# Patient Record
Sex: MALE | Race: OTHER | HISPANIC OR LATINO
[De-identification: names, ages, dates, MRNs, and addresses within clinical notes are randomized per-mention and may not be internally consistent; named-entity substitution may affect disease eponyms.]

---

## 2018-06-14 PROBLEM — Z00.00 ENCOUNTER FOR PREVENTIVE HEALTH EXAMINATION: Status: ACTIVE | Noted: 2018-06-14

## 2018-06-28 ENCOUNTER — APPOINTMENT (OUTPATIENT)
Dept: OPHTHALMOLOGY | Facility: CLINIC | Age: 83
End: 2018-06-28
Payer: MEDICARE

## 2018-06-28 DIAGNOSIS — H25.13 AGE-RELATED NUCLEAR CATARACT, BILATERAL: ICD-10-CM

## 2018-06-28 DIAGNOSIS — Z87.891 PERSONAL HISTORY OF NICOTINE DEPENDENCE: ICD-10-CM

## 2018-06-28 DIAGNOSIS — R73.03 PREDIABETES.: ICD-10-CM

## 2018-06-28 PROCEDURE — 92133 CPTRZD OPH DX IMG PST SGM ON: CPT

## 2018-06-28 PROCEDURE — 92285 EXTERNAL OCULAR PHOTOGRAPHY: CPT

## 2018-06-28 PROCEDURE — 99204 OFFICE O/P NEW MOD 45 MIN: CPT

## 2018-07-02 ENCOUNTER — RX RENEWAL (OUTPATIENT)
Age: 83
End: 2018-07-02

## 2018-07-02 RX ORDER — PREDNISOLONE ACETATE 10 MG/ML
1 SUSPENSION/ DROPS OPHTHALMIC
Qty: 10 | Refills: 3 | Status: DISCONTINUED | COMMUNITY
Start: 2018-07-02 | End: 2018-07-02

## 2018-07-15 ENCOUNTER — TRANSCRIPTION ENCOUNTER (OUTPATIENT)
Age: 83
End: 2018-07-15

## 2018-07-16 ENCOUNTER — OUTPATIENT (OUTPATIENT)
Dept: OUTPATIENT SERVICES | Facility: HOSPITAL | Age: 83
LOS: 1 days | End: 2018-07-16
Payer: MEDICARE

## 2018-07-16 ENCOUNTER — APPOINTMENT (OUTPATIENT)
Dept: OPHTHALMOLOGY | Facility: HOSPITAL | Age: 83
End: 2018-07-16
Payer: MEDICARE

## 2018-07-16 ENCOUNTER — RESULT REVIEW (OUTPATIENT)
Age: 83
End: 2018-07-16

## 2018-07-16 VITALS
TEMPERATURE: 99 F | WEIGHT: 262.35 LBS | HEIGHT: 64.5 IN | OXYGEN SATURATION: 97 % | DIASTOLIC BLOOD PRESSURE: 76 MMHG | HEART RATE: 76 BPM | SYSTOLIC BLOOD PRESSURE: 148 MMHG | RESPIRATION RATE: 13 BRPM

## 2018-07-16 VITALS
SYSTOLIC BLOOD PRESSURE: 151 MMHG | OXYGEN SATURATION: 97 % | DIASTOLIC BLOOD PRESSURE: 62 MMHG | RESPIRATION RATE: 20 BRPM | HEART RATE: 75 BPM

## 2018-07-16 DIAGNOSIS — Z98.890 OTHER SPECIFIED POSTPROCEDURAL STATES: Chronic | ICD-10-CM

## 2018-07-16 DIAGNOSIS — C44.122 SQUAMOUS CELL CARCINOMA OF SKIN OF RIGHT EYELID, INCLUDING CANTHUS: ICD-10-CM

## 2018-07-16 PROCEDURE — C1889: CPT

## 2018-07-16 PROCEDURE — 65426 REMOVAL OF EYE LESION: CPT | Mod: RT

## 2018-07-16 PROCEDURE — V2790: CPT

## 2018-07-16 PROCEDURE — 65779 COVER EYE W/MEMBRANE SUTURE: CPT | Mod: RT

## 2018-07-16 PROCEDURE — 68115 EXC LES CONJUNCTIVA >1 CM: CPT | Mod: RT

## 2018-07-16 PROCEDURE — 68110 EXC LES CONJUNCTIVA <1 CM: CPT | Mod: RT

## 2018-07-16 PROCEDURE — 65450 TREATMENT OF CORNEAL LESION: CPT | Mod: RT

## 2018-07-17 ENCOUNTER — APPOINTMENT (OUTPATIENT)
Dept: OPHTHALMOLOGY | Facility: CLINIC | Age: 83
End: 2018-07-17
Payer: MEDICARE

## 2018-07-17 PROCEDURE — 99024 POSTOP FOLLOW-UP VISIT: CPT

## 2018-07-17 RX ORDER — NEOMYCIN SULFATE, POLYMYXIN B SULFATE AND DEXAMETHASONE 3.5; 10000; 1 MG/ML; [USP'U]/ML; MG/ML
3.5-10000-0.1 SUSPENSION OPHTHALMIC 4 TIMES DAILY
Qty: 1 | Refills: 5 | Status: ACTIVE | COMMUNITY
Start: 2018-07-17 | End: 1900-01-01

## 2018-07-26 ENCOUNTER — APPOINTMENT (OUTPATIENT)
Dept: OPHTHALMOLOGY | Facility: CLINIC | Age: 83
End: 2018-07-26
Payer: MEDICARE

## 2018-07-26 DIAGNOSIS — H40.003 PREGLAUCOMA, UNSPECIFIED, BILATERAL: ICD-10-CM

## 2018-07-26 PROBLEM — I42.9 CARDIOMYOPATHY, UNSPECIFIED: Chronic | Status: ACTIVE | Noted: 2018-07-16

## 2018-07-26 PROBLEM — I10 ESSENTIAL (PRIMARY) HYPERTENSION: Chronic | Status: ACTIVE | Noted: 2018-07-16

## 2018-07-26 PROCEDURE — 99024 POSTOP FOLLOW-UP VISIT: CPT

## 2018-07-26 RX ORDER — DORZOLAMIDE HYDROCHLORIDE 20 MG/ML
2 SOLUTION OPHTHALMIC 3 TIMES DAILY
Qty: 1 | Refills: 5 | Status: ACTIVE | COMMUNITY
Start: 2018-07-26 | End: 1900-01-01

## 2018-07-26 RX ORDER — PREDNISOLONE ACETATE 10 MG/ML
1 SUSPENSION/ DROPS OPHTHALMIC DAILY
Qty: 1 | Refills: 5 | Status: ACTIVE | COMMUNITY
Start: 2018-07-26 | End: 1900-01-01

## 2018-07-26 RX ORDER — OFLOXACIN 3 MG/ML
0.3 SOLUTION/ DROPS OPHTHALMIC 4 TIMES DAILY
Qty: 1 | Refills: 3 | Status: ACTIVE | COMMUNITY
Start: 2018-07-02 | End: 1900-01-01

## 2018-08-17 ENCOUNTER — APPOINTMENT (OUTPATIENT)
Dept: OPHTHALMOLOGY | Facility: CLINIC | Age: 83
End: 2018-08-17
Payer: MEDICARE

## 2018-08-17 DIAGNOSIS — C69.10: ICD-10-CM

## 2018-08-17 PROCEDURE — 99024 POSTOP FOLLOW-UP VISIT: CPT

## 2018-09-16 ENCOUNTER — TRANSCRIPTION ENCOUNTER (OUTPATIENT)
Age: 83
End: 2018-09-16

## 2019-12-19 ENCOUNTER — INPATIENT (INPATIENT)
Facility: HOSPITAL | Age: 84
LOS: 1 days | Discharge: HOME | DRG: 853 | End: 2019-12-21
Attending: FAMILY MEDICINE | Admitting: HOSPITALIST
Payer: MEDICARE

## 2019-12-19 VITALS
DIASTOLIC BLOOD PRESSURE: 51 MMHG | SYSTOLIC BLOOD PRESSURE: 97 MMHG | RESPIRATION RATE: 18 BRPM | WEIGHT: 259.04 LBS | HEART RATE: 101 BPM | HEIGHT: 65 IN | TEMPERATURE: 98 F | OXYGEN SATURATION: 93 %

## 2019-12-19 DIAGNOSIS — R07.9 CHEST PAIN, UNSPECIFIED: ICD-10-CM

## 2019-12-19 DIAGNOSIS — A41.9 SEPSIS, UNSPECIFIED ORGANISM: ICD-10-CM

## 2019-12-19 DIAGNOSIS — Z98.890 OTHER SPECIFIED POSTPROCEDURAL STATES: Chronic | ICD-10-CM

## 2019-12-19 LAB
ADD ON TEST-SPECIMEN IN LAB: SIGNIFICANT CHANGE UP
ALBUMIN SERPL ELPH-MCNC: 3.2 G/DL — LOW (ref 3.3–5)
ALP SERPL-CCNC: 55 U/L — SIGNIFICANT CHANGE UP (ref 40–120)
ALT FLD-CCNC: 16 U/L — SIGNIFICANT CHANGE UP (ref 12–78)
ANION GAP SERPL CALC-SCNC: 5 MMOL/L — SIGNIFICANT CHANGE UP (ref 5–17)
APPEARANCE UR: CLEAR — SIGNIFICANT CHANGE UP
APTT BLD: 36.8 SEC — HIGH (ref 27.5–36.3)
AST SERPL-CCNC: 16 U/L — SIGNIFICANT CHANGE UP (ref 15–37)
BASOPHILS # BLD AUTO: 0.28 K/UL — HIGH (ref 0–0.2)
BASOPHILS NFR BLD AUTO: 1 % — SIGNIFICANT CHANGE UP (ref 0–2)
BILIRUB SERPL-MCNC: 0.6 MG/DL — SIGNIFICANT CHANGE UP (ref 0.2–1.2)
BILIRUB UR-MCNC: ABNORMAL
BUN SERPL-MCNC: 26 MG/DL — HIGH (ref 7–23)
CALCIUM SERPL-MCNC: 8.3 MG/DL — LOW (ref 8.5–10.1)
CHLORIDE SERPL-SCNC: 103 MMOL/L — SIGNIFICANT CHANGE UP (ref 96–108)
CO2 SERPL-SCNC: 28 MMOL/L — SIGNIFICANT CHANGE UP (ref 22–31)
COLOR SPEC: YELLOW — SIGNIFICANT CHANGE UP
CREAT SERPL-MCNC: 1.67 MG/DL — HIGH (ref 0.5–1.3)
D DIMER BLD IA.RAPID-MCNC: 606 NG/ML DDU — HIGH
DIFF PNL FLD: ABNORMAL
EOSINOPHIL # BLD AUTO: 0 K/UL — SIGNIFICANT CHANGE UP (ref 0–0.5)
EOSINOPHIL NFR BLD AUTO: 0 % — SIGNIFICANT CHANGE UP (ref 0–6)
GLUCOSE SERPL-MCNC: 134 MG/DL — HIGH (ref 70–99)
GLUCOSE UR QL: NEGATIVE MG/DL — SIGNIFICANT CHANGE UP
HCT VFR BLD CALC: 39.4 % — SIGNIFICANT CHANGE UP (ref 39–50)
HGB BLD-MCNC: 12.7 G/DL — LOW (ref 13–17)
INR BLD: 1.21 RATIO — HIGH (ref 0.88–1.16)
KETONES UR-MCNC: ABNORMAL
LACTATE SERPL-SCNC: 2.3 MMOL/L — HIGH (ref 0.7–2)
LEUKOCYTE ESTERASE UR-ACNC: ABNORMAL
LYMPHOCYTES # BLD AUTO: 0.84 K/UL — LOW (ref 1–3.3)
LYMPHOCYTES # BLD AUTO: 3 % — LOW (ref 13–44)
MCHC RBC-ENTMCNC: 28.7 PG — SIGNIFICANT CHANGE UP (ref 27–34)
MCHC RBC-ENTMCNC: 32.2 GM/DL — SIGNIFICANT CHANGE UP (ref 32–36)
MCV RBC AUTO: 89.1 FL — SIGNIFICANT CHANGE UP (ref 80–100)
MONOCYTES # BLD AUTO: 1.12 K/UL — HIGH (ref 0–0.9)
MONOCYTES NFR BLD AUTO: 4 % — SIGNIFICANT CHANGE UP (ref 2–14)
NEUTROPHILS # BLD AUTO: 25.53 K/UL — HIGH (ref 1.8–7.4)
NEUTROPHILS NFR BLD AUTO: 84 % — HIGH (ref 43–77)
NITRITE UR-MCNC: NEGATIVE — SIGNIFICANT CHANGE UP
NRBC # BLD: SIGNIFICANT CHANGE UP /100 WBCS (ref 0–0)
PH UR: 5 — SIGNIFICANT CHANGE UP (ref 5–8)
PLATELET # BLD AUTO: 215 K/UL — SIGNIFICANT CHANGE UP (ref 150–400)
POTASSIUM SERPL-MCNC: 4 MMOL/L — SIGNIFICANT CHANGE UP (ref 3.5–5.3)
POTASSIUM SERPL-SCNC: 4 MMOL/L — SIGNIFICANT CHANGE UP (ref 3.5–5.3)
PROT SERPL-MCNC: 7.4 GM/DL — SIGNIFICANT CHANGE UP (ref 6–8.3)
PROT UR-MCNC: 30 MG/DL
PROTHROM AB SERPL-ACNC: 13.5 SEC — HIGH (ref 10–12.9)
RBC # BLD: 4.42 M/UL — SIGNIFICANT CHANGE UP (ref 4.2–5.8)
RBC # FLD: 14.3 % — SIGNIFICANT CHANGE UP (ref 10.3–14.5)
SODIUM SERPL-SCNC: 136 MMOL/L — SIGNIFICANT CHANGE UP (ref 135–145)
SP GR SPEC: 1.02 — SIGNIFICANT CHANGE UP (ref 1.01–1.02)
TROPONIN I SERPL-MCNC: 0.24 NG/ML — HIGH (ref 0.01–0.04)
UROBILINOGEN FLD QL: 1 MG/DL
WBC # BLD: 28.06 K/UL — HIGH (ref 3.8–10.5)
WBC # FLD AUTO: 28.06 K/UL — HIGH (ref 3.8–10.5)

## 2019-12-19 PROCEDURE — 86901 BLOOD TYPING SEROLOGIC RH(D): CPT

## 2019-12-19 PROCEDURE — 94640 AIRWAY INHALATION TREATMENT: CPT

## 2019-12-19 PROCEDURE — 71275 CT ANGIOGRAPHY CHEST: CPT | Mod: 26

## 2019-12-19 PROCEDURE — 93010 ELECTROCARDIOGRAM REPORT: CPT

## 2019-12-19 PROCEDURE — 87633 RESP VIRUS 12-25 TARGETS: CPT

## 2019-12-19 PROCEDURE — 71045 X-RAY EXAM CHEST 1 VIEW: CPT | Mod: 26

## 2019-12-19 PROCEDURE — C1894: CPT

## 2019-12-19 PROCEDURE — 85025 COMPLETE CBC W/AUTO DIFF WBC: CPT

## 2019-12-19 PROCEDURE — C1887: CPT

## 2019-12-19 PROCEDURE — 80053 COMPREHEN METABOLIC PANEL: CPT

## 2019-12-19 PROCEDURE — 83605 ASSAY OF LACTIC ACID: CPT

## 2019-12-19 PROCEDURE — 93971 EXTREMITY STUDY: CPT | Mod: RT

## 2019-12-19 PROCEDURE — C1874: CPT

## 2019-12-19 PROCEDURE — 86900 BLOOD TYPING SEROLOGIC ABO: CPT

## 2019-12-19 PROCEDURE — C9600: CPT | Mod: RC

## 2019-12-19 PROCEDURE — 80048 BASIC METABOLIC PNL TOTAL CA: CPT

## 2019-12-19 PROCEDURE — C1769: CPT

## 2019-12-19 PROCEDURE — 84484 ASSAY OF TROPONIN QUANT: CPT

## 2019-12-19 PROCEDURE — 97116 GAIT TRAINING THERAPY: CPT | Mod: GP

## 2019-12-19 PROCEDURE — G0269: CPT

## 2019-12-19 PROCEDURE — C1889: CPT

## 2019-12-19 PROCEDURE — 87486 CHLMYD PNEUM DNA AMP PROBE: CPT

## 2019-12-19 PROCEDURE — 93306 TTE W/DOPPLER COMPLETE: CPT

## 2019-12-19 PROCEDURE — 87581 M.PNEUMON DNA AMP PROBE: CPT

## 2019-12-19 PROCEDURE — 97163 PT EVAL HIGH COMPLEX 45 MIN: CPT | Mod: GP

## 2019-12-19 PROCEDURE — C1760: CPT

## 2019-12-19 PROCEDURE — 93005 ELECTROCARDIOGRAM TRACING: CPT

## 2019-12-19 PROCEDURE — 36415 COLL VENOUS BLD VENIPUNCTURE: CPT

## 2019-12-19 PROCEDURE — 86850 RBC ANTIBODY SCREEN: CPT

## 2019-12-19 PROCEDURE — 87798 DETECT AGENT NOS DNA AMP: CPT

## 2019-12-19 PROCEDURE — C1725: CPT

## 2019-12-19 PROCEDURE — 93460 R&L HRT ART/VENTRICLE ANGIO: CPT | Mod: 59

## 2019-12-19 RX ORDER — AZITHROMYCIN 500 MG/1
500 TABLET, FILM COATED ORAL ONCE
Refills: 0 | Status: COMPLETED | OUTPATIENT
Start: 2019-12-19 | End: 2019-12-19

## 2019-12-19 RX ORDER — SODIUM CHLORIDE 9 MG/ML
1900 INJECTION INTRAMUSCULAR; INTRAVENOUS; SUBCUTANEOUS ONCE
Refills: 0 | Status: COMPLETED | OUTPATIENT
Start: 2019-12-19 | End: 2019-12-19

## 2019-12-19 RX ORDER — CEFTRIAXONE 500 MG/1
1000 INJECTION, POWDER, FOR SOLUTION INTRAMUSCULAR; INTRAVENOUS ONCE
Refills: 0 | Status: COMPLETED | OUTPATIENT
Start: 2019-12-19 | End: 2019-12-19

## 2019-12-19 RX ADMIN — SODIUM CHLORIDE 1266.67 MILLILITER(S): 9 INJECTION INTRAMUSCULAR; INTRAVENOUS; SUBCUTANEOUS at 23:16

## 2019-12-19 RX ADMIN — AZITHROMYCIN 255 MILLIGRAM(S): 500 TABLET, FILM COATED ORAL at 23:22

## 2019-12-19 RX ADMIN — CEFTRIAXONE 1000 MILLIGRAM(S): 500 INJECTION, POWDER, FOR SOLUTION INTRAMUSCULAR; INTRAVENOUS at 23:21

## 2019-12-19 NOTE — ED PROVIDER NOTE - ENMT, MLM
Airway patent, Nasal mucosa clear. Mouth with slightly dry mucosa. Throat has no vesicles, no oropharyngeal exudates and uvula is midline. +denture

## 2019-12-19 NOTE — ED PROVIDER NOTE - CARE PLAN
Principal Discharge DX:	Chest pain  Secondary Diagnosis:	Shortness of breath  Secondary Diagnosis:	Leukocytosis, unspecified type

## 2019-12-19 NOTE — ED ADULT TRIAGE NOTE - CHIEF COMPLAINT QUOTE
pt c/o intermittent SOB and chest pain that began this AM. has since resolved. code sepsis called at 2000.

## 2019-12-19 NOTE — ED PROVIDER NOTE - CHPI ED SYMPTOMS POS
CHEST PAIN/COUGH/CHEST TIGHTNESS/SHORTNESS OF BREATH/+SOB +diffuse myalgias +lightheadedness +weakness +fatigue + headache +chills/BACK PAIN

## 2019-12-19 NOTE — ED PROVIDER NOTE - CLINICAL SUMMARY MEDICAL DECISION MAKING FREE TEXT BOX
84  male PMHx of cardiomyopathy, HTN BIBA from home regarding intermittent CP, SOB, diffuse myalgias +chills +cough since this AM. Pt does not meet sepsis criteria by ED vitals. Plan: EKG, CXR labs including pan culture, troponin, BNP, D dimer. Monitor observes reassess. Addendum: elevated white count of 28, blood cultures lactate ordered, d dimer elevated with elevated troponin, CTA chest ordered.

## 2019-12-19 NOTE — ED PROVIDER NOTE - MUSCULOSKELETAL, MLM
Spine appears normal, range of motion is not limited, no muscle or joint tenderness. [place] x4, left leg tenderness +varicosities no pitting edema

## 2019-12-19 NOTE — ED ADULT NURSE NOTE - NSIMPLEMENTINTERV_GEN_ALL_ED
Implemented All Universal Safety Interventions:  [place] to call system. Call bell, personal items and telephone within reach. Instruct patient to call for assistance. Room bathroom lighting operational. Non-slip footwear when patient is off stretcher. Physically safe environment: no spills, clutter or unnecessary equipment. Stretcher in lowest position, wheels locked, appropriate side rails in place.

## 2019-12-19 NOTE — ED PROVIDER NOTE - OBJECTIVE STATEMENT
85 y/o male with PMHx of cardiomyopathy, HTN not on meds presents to ED BIBEMS c/o intermittent chest pain and SOB that began this morning. Pt does not meet sepsis criteria. Pt is [place] speaking and daughter at bedside is translating. Daughter at bedside reports pt was feeling nml this morning when he drove her to the train station and on his way home he felt chills in his hands and lightheadedness driving home. When he got home he had chills and was shivering but symptoms resolved after his wife covered him in blankets. +fatigue +SOB + left chest tightness worsens with breathing +weakness +back pain +diffuse myalgias +HA. x3-4 weeks family members with URI. No abd pain, no n/v/d. No other complaints at this time. NKDA. PCP: Delisa

## 2019-12-19 NOTE — ED ADULT NURSE NOTE - OBJECTIVE STATEMENT
Pt BIBA with daughter co intermittent CP since this Pt BIBA with daughter co intermittent CP since this morning at 0800. Pt states he has no medical history to his knowledge. Pt endorses SOB, states his chest pain has resolved. Pt also co minor headache.    Pt AnOx3, lung sounds clear, diminished at the bases. audible wheezing heard upon expiration. Pt placed on the monitor, normal sinus rhythm/sinus tachycardia intermittently on the monitor. Pt denies abdominal pain-. Abdomen soft and nontender. Bowel sounds hypoactive. Pt able to move all of his extremities. Bilateral lower extremities swollen, +2/+3 pitting edema. Pt endorses pain upon palpation of lower legs. As per family, the pts legs are always like that

## 2019-12-20 DIAGNOSIS — I25.10 ATHEROSCLEROTIC HEART DISEASE OF NATIVE CORONARY ARTERY WITHOUT ANGINA PECTORIS: ICD-10-CM

## 2019-12-20 LAB
ALBUMIN SERPL ELPH-MCNC: 2.7 G/DL — LOW (ref 3.3–5)
ALP SERPL-CCNC: 49 U/L — SIGNIFICANT CHANGE UP (ref 40–120)
ALT FLD-CCNC: 13 U/L — SIGNIFICANT CHANGE UP (ref 12–78)
ANION GAP SERPL CALC-SCNC: 5 MMOL/L — SIGNIFICANT CHANGE UP (ref 5–17)
AST SERPL-CCNC: 11 U/L — LOW (ref 15–37)
BASOPHILS # BLD AUTO: 0.04 K/UL — SIGNIFICANT CHANGE UP (ref 0–0.2)
BASOPHILS NFR BLD AUTO: 0.2 % — SIGNIFICANT CHANGE UP (ref 0–2)
BILIRUB SERPL-MCNC: 0.4 MG/DL — SIGNIFICANT CHANGE UP (ref 0.2–1.2)
BUN SERPL-MCNC: 25 MG/DL — HIGH (ref 7–23)
CALCIUM SERPL-MCNC: 7.7 MG/DL — LOW (ref 8.5–10.1)
CHLORIDE SERPL-SCNC: 110 MMOL/L — HIGH (ref 96–108)
CO2 SERPL-SCNC: 26 MMOL/L — SIGNIFICANT CHANGE UP (ref 22–31)
CREAT SERPL-MCNC: 1.22 MG/DL — SIGNIFICANT CHANGE UP (ref 0.5–1.3)
EOSINOPHIL # BLD AUTO: 0.01 K/UL — SIGNIFICANT CHANGE UP (ref 0–0.5)
EOSINOPHIL NFR BLD AUTO: 0 % — SIGNIFICANT CHANGE UP (ref 0–6)
GLUCOSE SERPL-MCNC: 102 MG/DL — HIGH (ref 70–99)
HCT VFR BLD CALC: 36.2 % — LOW (ref 39–50)
HGB BLD-MCNC: 11.5 G/DL — LOW (ref 13–17)
IMM GRANULOCYTES NFR BLD AUTO: 0.6 % — SIGNIFICANT CHANGE UP (ref 0–1.5)
LYMPHOCYTES # BLD AUTO: 1.17 K/UL — SIGNIFICANT CHANGE UP (ref 1–3.3)
LYMPHOCYTES # BLD AUTO: 5.8 % — LOW (ref 13–44)
MCHC RBC-ENTMCNC: 28.3 PG — SIGNIFICANT CHANGE UP (ref 27–34)
MCHC RBC-ENTMCNC: 31.8 GM/DL — LOW (ref 32–36)
MCV RBC AUTO: 88.9 FL — SIGNIFICANT CHANGE UP (ref 80–100)
MONOCYTES # BLD AUTO: 0.9 K/UL — SIGNIFICANT CHANGE UP (ref 0–0.9)
MONOCYTES NFR BLD AUTO: 4.5 % — SIGNIFICANT CHANGE UP (ref 2–14)
NEUTROPHILS # BLD AUTO: 17.86 K/UL — HIGH (ref 1.8–7.4)
NEUTROPHILS NFR BLD AUTO: 88.9 % — HIGH (ref 43–77)
PLATELET # BLD AUTO: 174 K/UL — SIGNIFICANT CHANGE UP (ref 150–400)
POTASSIUM SERPL-MCNC: 3.7 MMOL/L — SIGNIFICANT CHANGE UP (ref 3.5–5.3)
POTASSIUM SERPL-SCNC: 3.7 MMOL/L — SIGNIFICANT CHANGE UP (ref 3.5–5.3)
PROT SERPL-MCNC: 6.2 GM/DL — SIGNIFICANT CHANGE UP (ref 6–8.3)
RAPID RVP RESULT: SIGNIFICANT CHANGE UP
RBC # BLD: 4.07 M/UL — LOW (ref 4.2–5.8)
RBC # FLD: 14.6 % — HIGH (ref 10.3–14.5)
SODIUM SERPL-SCNC: 141 MMOL/L — SIGNIFICANT CHANGE UP (ref 135–145)
TROPONIN I SERPL-MCNC: 0.28 NG/ML — HIGH (ref 0.01–0.04)
TROPONIN I SERPL-MCNC: 0.29 NG/ML — HIGH (ref 0.01–0.04)
WBC # BLD: 20.11 K/UL — HIGH (ref 3.8–10.5)
WBC # FLD AUTO: 20.11 K/UL — HIGH (ref 3.8–10.5)

## 2019-12-20 PROCEDURE — 93010 ELECTROCARDIOGRAM REPORT: CPT

## 2019-12-20 PROCEDURE — 93306 TTE W/DOPPLER COMPLETE: CPT | Mod: 26

## 2019-12-20 PROCEDURE — 93971 EXTREMITY STUDY: CPT | Mod: 26,RT

## 2019-12-20 RX ORDER — METOPROLOL TARTRATE 50 MG
25 TABLET ORAL
Refills: 0 | Status: DISCONTINUED | OUTPATIENT
Start: 2019-12-20 | End: 2019-12-21

## 2019-12-20 RX ORDER — ACETAMINOPHEN 500 MG
650 TABLET ORAL ONCE
Refills: 0 | Status: COMPLETED | OUTPATIENT
Start: 2019-12-20 | End: 2019-12-20

## 2019-12-20 RX ORDER — VANCOMYCIN HCL 1 G
750 VIAL (EA) INTRAVENOUS EVERY 12 HOURS
Refills: 0 | Status: DISCONTINUED | OUTPATIENT
Start: 2019-12-20 | End: 2019-12-21

## 2019-12-20 RX ORDER — CLOPIDOGREL BISULFATE 75 MG/1
75 TABLET, FILM COATED ORAL DAILY
Refills: 0 | Status: DISCONTINUED | OUTPATIENT
Start: 2019-12-21 | End: 2019-12-21

## 2019-12-20 RX ORDER — CEFTRIAXONE 500 MG/1
2000 INJECTION, POWDER, FOR SOLUTION INTRAMUSCULAR; INTRAVENOUS EVERY 24 HOURS
Refills: 0 | Status: DISCONTINUED | OUTPATIENT
Start: 2019-12-20 | End: 2019-12-21

## 2019-12-20 RX ORDER — CLOPIDOGREL BISULFATE 75 MG/1
600 TABLET, FILM COATED ORAL ONCE
Refills: 0 | Status: COMPLETED | OUTPATIENT
Start: 2019-12-20 | End: 2019-12-20

## 2019-12-20 RX ORDER — ASPIRIN/CALCIUM CARB/MAGNESIUM 324 MG
325 TABLET ORAL ONCE
Refills: 0 | Status: COMPLETED | OUTPATIENT
Start: 2019-12-20 | End: 2019-12-20

## 2019-12-20 RX ORDER — ATORVASTATIN CALCIUM 80 MG/1
20 TABLET, FILM COATED ORAL AT BEDTIME
Refills: 0 | Status: DISCONTINUED | OUTPATIENT
Start: 2019-12-20 | End: 2019-12-21

## 2019-12-20 RX ORDER — ASPIRIN/CALCIUM CARB/MAGNESIUM 324 MG
81 TABLET ORAL DAILY
Refills: 0 | Status: DISCONTINUED | OUTPATIENT
Start: 2019-12-21 | End: 2019-12-21

## 2019-12-20 RX ORDER — ACETAMINOPHEN 500 MG
650 TABLET ORAL EVERY 6 HOURS
Refills: 0 | Status: DISCONTINUED | OUTPATIENT
Start: 2019-12-20 | End: 2019-12-21

## 2019-12-20 RX ORDER — SODIUM CHLORIDE 9 MG/ML
1000 INJECTION INTRAMUSCULAR; INTRAVENOUS; SUBCUTANEOUS
Refills: 0 | Status: DISCONTINUED | OUTPATIENT
Start: 2019-12-20 | End: 2019-12-20

## 2019-12-20 RX ORDER — SODIUM CHLORIDE 9 MG/ML
1000 INJECTION INTRAMUSCULAR; INTRAVENOUS; SUBCUTANEOUS
Refills: 0 | Status: DISCONTINUED | OUTPATIENT
Start: 2019-12-20 | End: 2019-12-21

## 2019-12-20 RX ADMIN — Medication 325 MILLIGRAM(S): at 10:19

## 2019-12-20 RX ADMIN — Medication 650 MILLIGRAM(S): at 01:28

## 2019-12-20 RX ADMIN — ATORVASTATIN CALCIUM 20 MILLIGRAM(S): 80 TABLET, FILM COATED ORAL at 21:32

## 2019-12-20 RX ADMIN — SODIUM CHLORIDE 75 MILLILITER(S): 9 INJECTION INTRAMUSCULAR; INTRAVENOUS; SUBCUTANEOUS at 17:20

## 2019-12-20 RX ADMIN — CLOPIDOGREL BISULFATE 600 MILLIGRAM(S): 75 TABLET, FILM COATED ORAL at 10:19

## 2019-12-20 RX ADMIN — Medication 25 MILLIGRAM(S): at 17:20

## 2019-12-20 RX ADMIN — SODIUM CHLORIDE 1900 MILLILITER(S): 9 INJECTION INTRAMUSCULAR; INTRAVENOUS; SUBCUTANEOUS at 03:36

## 2019-12-20 RX ADMIN — Medication 650 MILLIGRAM(S): at 02:00

## 2019-12-20 RX ADMIN — SODIUM CHLORIDE 50 MILLILITER(S): 9 INJECTION INTRAMUSCULAR; INTRAVENOUS; SUBCUTANEOUS at 03:36

## 2019-12-20 RX ADMIN — Medication 250 MILLIGRAM(S): at 17:14

## 2019-12-20 RX ADMIN — AZITHROMYCIN 500 MILLIGRAM(S): 500 TABLET, FILM COATED ORAL at 00:07

## 2019-12-20 RX ADMIN — CEFTRIAXONE 2000 MILLIGRAM(S): 500 INJECTION, POWDER, FOR SOLUTION INTRAMUSCULAR; INTRAVENOUS at 17:15

## 2019-12-20 NOTE — H&P ADULT - HISTORY OF PRESENT ILLNESS
84 year old male patient with pertinent hx of HTN and Cardiomyopathy(not on home medications) presented to the ED complaining of sudden onset fever, chills, and body aches associated with dyspnea on exertion and chest tightness. patient was in his usual state of health less than 12 hours prior to arrival in the ED.  Patient endorsed feeling as though he had a fever but denies and coughing, runny nose, congestion, diarrhea, abdominal pain, skin rashes, headache or dizziness. Patient seen and evaluated at the office of his PCP prior to ED arrival and was referred to the ED for hypotension and tachycardia.      In the ED patient was found to have a WBC of 28, Lactate of 2.3, troponin of 0.281 and a BUN/CR of 26/1.67

## 2019-12-20 NOTE — H&P ADULT - NSHPPHYSICALEXAM_GEN_ALL_CORE
Vital Signs Last 24 Hrs  T(C): 37.8 (19 Dec 2019 23:30), Max: 37.8 (19 Dec 2019 23:30)  T(F): 100.1 (19 Dec 2019 23:30), Max: 100.1 (19 Dec 2019 23:30)  HR: 92 (19 Dec 2019 23:30) (92 - 101)  BP: 123/54 (19 Dec 2019 23:30) (97/51 - 146/59)  BP(mean): 72 (19 Dec 2019 23:30) (72 - 84)  RR: 22 (19 Dec 2019 23:30) (18 - 26)  SpO2: 95% (19 Dec 2019 23:30) (91% - 98%)

## 2019-12-20 NOTE — CHART NOTE - NSCHARTNOTEFT_GEN_A_CORE
-Consent obtained for cardiac catheterization w/ coronary angiogram and possible stent placement. Pt is competent, has capacity, and understands risks and benefits of procedure. Patient deferred signing to his daughter, Umair Hernandez who is present at bedside. Risks and benefits discussed. Risk discussed included, but not limited to MI, stroke, mortality, major bleeding, arrythmia, or infection. All questions answered     ASA class: III  Creatinine: 1.22  GFR: 54  Bleeding  Risk score: 1.6%

## 2019-12-20 NOTE — PROGRESS NOTE ADULT - SUBJECTIVE AND OBJECTIVE BOX
Cath Lab Nurse Practitioner Note  HPI:  84 year old male patient with pertinent hx of HTN and Cardiomyopathy(not on home medications) presented to the ED complaining of sudden onset fever, chills, and body aches associated with dyspnea on exertion and chest tightness. patient was in his usual state of health less than 12 hours prior to arrival in the ED.  In the ED patient was found to have a WBC of 28, Lactate of 2.3, troponin of 0.281 and a BUN/CR of 26/1.67. Admitted for CP and leukocytosis.     Vital Signs Last 24 Hrs  T(F): 97.2   HR: 85   BP: 131/57   RR: 17  SpO2: 98%     Labs:                        11.5   20.11 )-----------( 174      ( 20 Dec 2019 07:20 )             36.2   12-20    141  |  110<H>  |  25<H>  ----------------------------<  102<H>  3.7   |  26  |  1.22    Ca    7.7<L>      20 Dec 2019 07:20    TPro  6.2  /  Alb  2.7<L>  /  TBili  0.4  /  DBili  x   /  AST  11<L>  /  ALT  13  /  AlkPhos  49  12-20    PT/INR - ( 19 Dec 2019 20:22 )   PT: 13.5 sec;   INR: 1.21 ratio         PTT - ( 19 Dec 2019 20:22 )  PTT:36.8 sec  MEDICATIONS  (STANDING):  aspirin 325 milliGRAM(s) Oral once  atorvastatin 20 milliGRAM(s) Oral at bedtime  clopidogrel Tablet 600 milliGRAM(s) Oral once  metoprolol tartrate 25 milliGRAM(s) Oral two times a day  sodium chloride 0.9%. 1000 milliLiter(s) (50 mL/Hr) IV Continuous <Continuous>    EKG: SR rate 88bpm no ST acute changes  post PCI     PROCEDURE RESULTS- full report to follow

## 2019-12-20 NOTE — PROGRESS NOTE ADULT - ASSESSMENT
84 year old male patient with pertinent hx of HTN and Cardiomyopathy(not on home medications) presented to the ED complaining of sudden onset fever, chills, and body aches associated with dyspnea on exertion and chest tightness. patient was in his usual state of health less than 12 hours prior to arrival in the ED.  In the ED patient was found to have a WBC of 28, Lactate of 2.3, troponin of 0.281 and a BUN/CR of 26/1.67. Admitted for CP and leukocytosis. Referred for cardiac catheterization for evaluation   S/P C with ROCAEL to RPDA x 1

## 2019-12-20 NOTE — PROGRESS NOTE ADULT - PROBLEM SELECTOR PLAN 1
Pt last seen:05/09/19  Upcoming appt: none  Last refill sent:03/06/19 -CAD, s/p ROCAEL to RPDA  pt to return to 3N  -IV hydration NS @ 50mL/hr x 10 hours  VS, lab, diet and activity per PCI post orders  -ASA 325mg PO x 1 now  - Plavix 600mg PO x 1 now  -will start ASA 81mg daily 12/21 plavix 75mg daily 12/21, lipitor 20mg at bedtime starting tonight, lopressor 25mg BID starting today  -f/u EKG in AM, AM Labs  -plan discussed with patient and Dr Grover and Dr. Garza   -Discussed therapeutic lifestyle changes to reduce risk factors such as following a cardiac diet, weight loss, maintaining a healthy weight, exercise, medication compliance, and regular follow-up  with PCP/Cardioloigst

## 2019-12-20 NOTE — PHYSICAL THERAPY INITIAL EVALUATION ADULT - MODALITIES TREATMENT COMMENTS
pt left in bed supine post Eval; bed alarm on; HM in place; daughter present; callbell in reach; parisa well; denied pain

## 2019-12-20 NOTE — CONSULT NOTE ADULT - ASSESSMENT
85 y/o male with h/o HTN and Cardiomyopathy (not on home medications) was admitted on 12/19 sudden onset fever, chills, and body aches associated with dyspnea on exertion and chest tightness. The patient was in his usual state of health less than 12 hours prior to arrival in the ED. Patient endorsed feeling feverish. Patient seen and evaluated at the office of his PCP prior to ED arrival and was referred to the ED for hypotension and tachycardia. In the ED patient was found to have a WBC of 28. He received ceftriaxone and azithromycin.    1. Low grade fever.   -leukocytosis 85 y/o male with h/o HTN and Cardiomyopathy (not on home medications) was admitted on 12/19 sudden onset fever, chills, and body aches associated with dyspnea on exertion and chest tightness. The patient was in his usual state of health less than 12 hours prior to arrival in the ED. Patient endorsed feeling feverish. Patient seen and evaluated at the office of his PCP prior to ED arrival and was referred to the ED for hypotension and tachycardia. In the ED patient was found to have a WBC of 28. He received ceftriaxone and azithromycin.    1. Low grade fever. Probable sepsis. RLE cellulitis. Chest pain syndrome. CAD s/p cardiac stent.  -leukocytosis  -obtain BX 2  -start vancomycin 1 gm IV q12h and ceftriaxone 2 gm IV qd  -reason for abx use and side effects reviewed with patient; monitor BMP and vancomycin trough levels   -elevate legs  -old chart reviewed to assess prior cultures  -monitor temps  -f/u CBC  -supportive care  2. Other issues:   -care per medicine

## 2019-12-20 NOTE — PROGRESS NOTE ADULT - SUBJECTIVE AND OBJECTIVE BOX
patient seen and examined in recovery room after cath.   patient was admitted by my colleague earlier today.  says dyspnea is much better after stent placement.  d/w cardiac NP.    has RLE pain, erythema and edema. will check venous doppler to r/o DVT. defer antibx to ID.    will follow. patient seen and examined in recovery room after cath.   patient was admitted by my colleague earlier today.  says dyspnea is much better after stent placement.  s/p PCI to R posterior descending artery. on aspirin/plavix/BB/statin.  d/w cardiac NP.  cardiology and ID consults appreciated.  has RLE pain, erythema and edema. will check venous doppler to r/o DVT. on vanco and ceftriaxone per ID.

## 2019-12-20 NOTE — CHART NOTE - NSCHARTNOTEFT_GEN_A_CORE
7F venous cath removed from right groin  by writer. 10mins of direct pressure applied. Hemostasis achieved. Site is without hematoma or bleeding, surrounding area soft.  Sensation and ROM intact. Distal pulses palpable, 2+ capillary refill  < 2secs. Patient denies pain, numbness, tingling, CP or SOB . Clean dry dressing applied. Patient tolerated without any complications. Perclose site soft without hematoma or bleeding, surrounding area soft.

## 2019-12-20 NOTE — PHYSICAL THERAPY INITIAL EVALUATION ADULT - CRITERIA FOR SKILLED THERAPEUTIC INTERVENTIONS
will attempt completion of Eval @ later date; pt currently on bedrest s/p cardiac Cath; further course of PT intervention pending

## 2019-12-20 NOTE — PROGRESS NOTE ADULT - ADDITIONAL PE
PROCEDURE SITE: Right femoral accessed, closed by perclose device. Site is without hematoma or bleeding. Sensation and MARGARITO intact. Distal pulses palpable 2+, capillary refill < 2 seconds. Patient denies pain, numbness, tingling, CP or SOB. Clean dry dressing applied.   Right venous sheath in placed can be removed 2 hours post procedure

## 2019-12-20 NOTE — CONSULT NOTE ADULT - SUBJECTIVE AND OBJECTIVE BOX
Chief complaint of Chest pain  SIRS (20 Dec 2019 02:06)      HPI:  84 year old male patient with pertinent hx of HTN and Cardiomyopathy(not on home medications) presented to the ED complaining of sudden onset fever, chills, and body aches associated with dyspnea on exertion and chest tightness. patient was in his usual state of health less than 12 hours prior to arrival in the ED.  Patient endorsed feeling as though he had a fever but denies and coughing, runny nose, congestion, diarrhea, abdominal pain, skin rashes, headache or dizziness. Patient seen and evaluated at the office of his PCP prior to ED arrival and was referred to the ED for hypotension and tachycardia.      In the ED patient was found to have a WBC of 28, Lactate of 2.3, troponin of 0.281 and a BUN/CR of 26/1.67 (20 Dec 2019 02:06)      PAST MEDICAL & SURGICAL HISTORY:  HTN (hypertension)  Cardiomyopathy  History of colonoscopy      PREVIOUS CARDIAC WORKUP:      Echo:  Stress Test:  Cardiac Cath:    ALLERGIES:    No Known Allergies       MEDICATIONS  (STANDING):  sodium chloride 0.9%. 1000 milliLiter(s) (50 mL/Hr) IV Continuous <Continuous>    MEDICATIONS  (PRN):  acetaminophen   Tablet .. 650 milliGRAM(s) Oral every 6 hours PRN Temp greater or equal to 38C (100.4F), Mild Pain (1 - 3)      FAMILY HISTORY:        SOCIAL HISTORY:  .scl     ROS:     .ros    Vital Signs Last 24 Hrs  T(C): 36.7 (20 Dec 2019 06:10), Max: 37.8 (19 Dec 2019 23:30)  T(F): 98 (20 Dec 2019 06:10), Max: 100.1 (19 Dec 2019 23:30)  HR: 89 (20 Dec 2019 06:10) (89 - 101)  BP: 122/65 (20 Dec 2019 06:10) (97/51 - 146/59)  BP(mean): 79 (20 Dec 2019 06:10) (72 - 84)  RR: 24 (20 Dec 2019 06:10) (18 - 26)  SpO2: 99% (20 Dec 2019 06:10) (91% - 99%)    I&O's Summary      PHYSICAL EXAM:    .phy      TELEMETRY:    ECG:    LABS:                          12.7   28.06 )-----------( 215      ( 19 Dec 2019 20:22 )             39.4     12-19    136  |  103  |  26<H>  ----------------------------<  134<H>  4.0   |  28  |  1.67<H>    Ca    8.3<L>      19 Dec 2019 20:22    TPro  7.4  /  Alb  3.2<L>  /  TBili  0.6  /  DBili  x   /  AST  16  /  ALT  16  /  AlkPhos  55   @ 03:08  Trop-I  0.290     @ 00:38  Trop-I  0.281     @ 20:22  Trop-I  0.244    Pro BNP  1831  @ 20:22  D Dimer  606  @ 20:22    PT/INR - ( 19 Dec 2019 20:22 )   PT: 13.5 sec;   INR: 1.21 ratio    PTT - ( 19 Dec 2019 20:22 )  PTT:36.8 sec    Urinalysis Basic - ( 19 Dec 2019 23:08 )    Color: Yellow / Appearance: Clear / S.020 / pH: x  Gluc: x / Ketone: Trace  / Bili: Moderate / Urobili: 1 mg/dL   Blood: x / Protein: 30 mg/dL / Nitrite: Negative   Leuk Esterase: Trace / RBC: 25-50 /HPF / WBC 3-5   Sq Epi: x / Non Sq Epi: Occasional / Bacteria: Few    RADIOLOGY & ADDITIONAL STUDIES:    CT Angio Chest PE Protocol w/ IV Cont (19 @ 22:58) >  IMPRESSION:  No pulmonary embolism. Chief complaint of Chest pain    HPI:  85 yo male with c/o sudden onset chills, low grade fever, body aches. Felt weak. BP noted to be low. He then c/o chest tightness and dyspnea on exertion. Chest pain / tightness continued. Noted to have hypotension and tachycardia while in the hospital. Per daughter who is also acting as a  he has been having progressive dyspnea and chest tightness on exertion. Noted to have abnormal CE.      PAST MEDICAL & SURGICAL HISTORY:  HTN (hypertension)  Hypertrophic Cardiomyopathy  History of colonoscopy      PREVIOUS CARDIAC WORKUP:      Echo:  Hypertrophic heart, normal EF  Stress Test:  No ischemia      ALLERGIES:    No Known Allergies       MEDICATIONS  (STANDING):  sodium chloride 0.9%. 1000 milliLiter(s) (50 mL/Hr) IV Continuous <Continuous>    MEDICATIONS  (PRN):  acetaminophen   Tablet .. 650 milliGRAM(s) Oral every 6 hours PRN Temp greater or equal to 38C (100.4F), Mild Pain (1 - 3)      FAMILY HISTORY:  NC        SOCIAL HISTORY:  Nonsmoker. No ETOH abuse. No illicit drugs.     ROS:     Detailed ten system ROS was performed and was negative except for history as eluded to above.    no fever  no chills  no nausea  no vomiting  no diarrhea  no constipation  no melena  no hematochezia  no chest pain  no palpitations  no sob at rest  no dyspnea on exertion  no cough  no wheezing  no anorexia  no headache  no dizziness  no syncope  + weakness  no myalgia  no dysuria  no polyuria  no hematuria     Vital Signs Last 24 Hrs  T(C): 36.7 (20 Dec 2019 06:10), Max: 37.8 (19 Dec 2019 23:30)  T(F): 98 (20 Dec 2019 06:10), Max: 100.1 (19 Dec 2019 23:30)  HR: 89 (20 Dec 2019 06:10) (89 - 101)  BP: 122/65 (20 Dec 2019 06:10) (97/51 - 146/59)  BP(mean): 79 (20 Dec 2019 06:10) (72 - 84)  RR: 24 (20 Dec 2019 06:10) (18 - 26)  SpO2: 99% (20 Dec 2019 06:10) (91% - 99%)      PHYSICAL EXAM:    General:                Comfortable, AAO X 3, in no distress. Obese  HEENT:                  Atraumatic, PERRLA, EOMI, conjunctiva clear.    Neck:                     Supple, no adenopathy, no thyromegaly, no JVD, no bruit.  Chest:                    Clear, B/L symmetric air entry, no tachypnea  Heart:                     S1, S2 normal,  no murmur.  Abdomen:              Soft, non-tender, bowel sounds active. No palpable masses.  Extremities:           no cyanosis, + edema. Peripheral pulses normal.  Skin:                      Skin color, texture normal. No rashes.  Neurologic:            Grossly nonfocal.       TELEMETRY:  Normal sinus rhythm with no tachy or angel events     ECG:  NSR, first deg AV block, no ST T changes of ischemia or infarction.     LABS:                          12.7   28.06 )-----------( 215      ( 19 Dec 2019 20:22 )             39.4         136  |  103  |  26<H>  ----------------------------<  134<H>  4.0   |  28  |  1.67<H>    Ca    8.3<L>      19 Dec 2019 20:22    TPro  7.4  /  Alb  3.2<L>  /  TBili  0.6  /  DBili  x   /  AST  16  /  ALT  16  /  AlkPhos  55   @ 03:08  Trop-I  0.290     @ 00:38  Trop-I  0.281     @ 20:22  Trop-I  0.244    Pro BNP  1831  @ 20:22  D Dimer  606  @ 20:22    PT/INR - ( 19 Dec 2019 20:22 )   PT: 13.5 sec;   INR: 1.21 ratio    PTT - ( 19 Dec 2019 20:22 )  PTT:36.8 sec    Urinalysis Basic - ( 19 Dec 2019 23:08 )    Color: Yellow / Appearance: Clear / S.020 / pH: x  Gluc: x / Ketone: Trace  / Bili: Moderate / Urobili: 1 mg/dL   Blood: x / Protein: 30 mg/dL / Nitrite: Negative   Leuk Esterase: Trace / RBC: 25-50 /HPF / WBC 3-5   Sq Epi: x / Non Sq Epi: Occasional / Bacteria: Few    RADIOLOGY & ADDITIONAL STUDIES:    CT Angio Chest PE Protocol w/ IV Cont (19 @ 22:58) >  IMPRESSION:  No pulmonary embolism.

## 2019-12-20 NOTE — CONSULT NOTE ADULT - SUBJECTIVE AND OBJECTIVE BOX
Patient is a 84y old  Male who presents with a chief complaint of Chest pain  SIRS     HPI:  85 y/o male with h/o HTN and Cardiomyopathy (not on home medications) was admitted on  sudden onset fever, chills, and body aches associated with dyspnea on exertion and chest tightness. The patient was in his usual state of health less than 12 hours prior to arrival in the ED. Patient endorsed feeling feverish. Patient seen and evaluated at the office of his PCP prior to ED arrival and was referred to the ED for hypotension and tachycardia. In the ED patient was found to have a WBC of 28. He received ceftriaxone and azithromycin.      PMH: as above  PSH: as above  Meds: per reconciliation sheet, noted below  MEDICATIONS  (STANDING):  atorvastatin 20 milliGRAM(s) Oral at bedtime  metoprolol tartrate 25 milliGRAM(s) Oral two times a day  sodium chloride 0.9%. 1000 milliLiter(s) (50 mL/Hr) IV Continuous <Continuous>    MEDICATIONS  (PRN):  acetaminophen   Tablet .. 650 milliGRAM(s) Oral every 6 hours PRN Temp greater or equal to 38C (100.4F), Mild Pain (1 - 3)    Allergies    No Known Allergies    Intolerances      Social: no smoking, no alcohol, no illegal drugs; no recent travel, no exposure to TB  FAMILY HISTORY:    no history of premature cardiovascular disease in first degree relatives    ROS: the patient felt feverish; no chills, no HA, no seizures, no dizziness, no sore throat, no nasal congestion, no blurry vision, had CP, no palpitations, has SOB, no cough, no abdominal pain, no diarrhea, no N/V, no dysuria, no leg pain, no claudication, no rash, no joint aches, no rectal pain or bleeding, no night sweats  All other systems reviewed and are negative    Vital Signs Last 24 Hrs  T(C): 36.2 (20 Dec 2019 08:30), Max: 37.8 (19 Dec 2019 23:30)  T(F): 97.2 (20 Dec 2019 08:30), Max: 100.1 (19 Dec 2019 23:30)  HR: 85 (20 Dec 2019 10:10) (85 - 101)  BP: 132/63 (20 Dec 2019 10:10) (97/51 - 146/59)  BP(mean): 79 (20 Dec 2019 06:10) (72 - 84)  RR: 17 (20 Dec 2019 10:10) (16 - 26)  SpO2: 98% (20 Dec 2019 10:10) (91% - 99%)  Daily Height in cm: 165.1 (19 Dec 2019 20:14)    Daily     PE:    Constitutional: frail looking  HEENT: NC/AT, EOMI, PERRLA, conjunctivae clear; ears and nose atraumatic; pharynx benign  Neck: supple; thyroid not palpable  Back: no tenderness  Respiratory: respiratory effort normal; clear to auscultation  Cardiovascular: S1S2 regular, no murmurs  Abdomen: soft, not tender, not distended, positive BS; no liver or spleen organomegaly  Genitourinary: no suprapubic tenderness  Lymphatic: no LN palpable  Musculoskeletal: no muscle tenderness, no joint swelling or tenderness  Extremities: no pedal edema  Neurological/ Psychiatric: AxOx3, judgement and insight normal; moving all extremities  Skin: no rashes; no palpable lesions    Labs: all available labs reviewed                        11.5    )-----------( 174      ( 20 Dec 2019 07:20 )             36.2     12-20    141  |  110<H>  |  25<H>  ----------------------------<  102<H>  3.7   |  26  |  1.22    Ca    7.7<L>      20 Dec 2019 07:20    TPro  6.2  /  Alb  2.7<L>  /  TBili  0.4  /  DBili  x   /  AST  11<L>  /  ALT  13  /  AlkPhos  49  12-20     LIVER FUNCTIONS - ( 20 Dec 2019 07:20 )  Alb: 2.7 g/dL / Pro: 6.2 gm/dL / ALK PHOS: 49 U/L / ALT: 13 U/L / AST: 11 U/L / GGT: x           Urinalysis Basic - ( 19 Dec 2019 23:08 )    Color: Yellow / Appearance: Clear / S.020 / pH: x  Gluc: x / Ketone: Trace  / Bili: Moderate / Urobili: 1 mg/dL   Blood: x / Protein: 30 mg/dL / Nitrite: Negative   Leuk Esterase: Trace / RBC: 25-50 /HPF / WBC 3-5   Sq Epi: x / Non Sq Epi: Occasional / Bacteria: Few          Radiology: all available radiological tests reviewed    Advanced directives addressed: full resuscitation Patient is a 84y old  Male who presents with a chief complaint of Chest pain; SIRS     HPI:  83 y/o male with h/o HTN and Cardiomyopathy (not on home medications) was admitted on  sudden onset fever, chills, and body aches associated with dyspnea on exertion and chest tightness. The patient was in his usual state of health less than 12 hours prior to arrival in the ED. Patient endorsed feeling feverish. Patient seen and evaluated at the office of his PCP prior to ED arrival and was referred to the ED for hypotension and tachycardia. In the ED patient was found to have a WBC of 28. He received ceftriaxone and azithromycin.      PMH: as above  PSH: as above  Meds: per reconciliation sheet, noted below  MEDICATIONS  (STANDING):  atorvastatin 20 milliGRAM(s) Oral at bedtime  metoprolol tartrate 25 milliGRAM(s) Oral two times a day  sodium chloride 0.9%. 1000 milliLiter(s) (50 mL/Hr) IV Continuous <Continuous>    MEDICATIONS  (PRN):  acetaminophen   Tablet .. 650 milliGRAM(s) Oral every 6 hours PRN Temp greater or equal to 38C (100.4F), Mild Pain (1 - 3)    Allergies    No Known Allergies    Intolerances      Social: no smoking, no alcohol, no illegal drugs; no recent travel, no exposure to TB  FAMILY HISTORY:    no history of premature cardiovascular disease in first degree relatives    ROS: the patient felt feverish; no chills, no HA, no seizures, no dizziness, no sore throat, no nasal congestion, no blurry vision, had CP, no palpitations, has SOB, no cough, no abdominal pain, no diarrhea, no N/V, no dysuria, no leg pain, no claudication, no rash, no joint aches, no rectal pain or bleeding, no night sweats  All other systems reviewed and are negative    Vital Signs Last 24 Hrs  T(C): 36.2 (20 Dec 2019 08:30), Max: 37.8 (19 Dec 2019 23:30)  T(F): 97.2 (20 Dec 2019 08:30), Max: 100.1 (19 Dec 2019 23:30)  HR: 85 (20 Dec 2019 10:10) (85 - 101)  BP: 132/63 (20 Dec 2019 10:10) (97/51 - 146/59)  BP(mean): 79 (20 Dec 2019 06:10) (72 - 84)  RR: 17 (20 Dec 2019 10:10) (16 - 26)  SpO2: 98% (20 Dec 2019 10:10) (91% - 99%)  Daily Height in cm: 165.1 (19 Dec 2019 20:14)    Daily     PE:    Constitutional: frail looking  HEENT: NC/AT, EOMI, PERRLA, conjunctivae clear; ears and nose atraumatic; pharynx benign  Neck: supple; thyroid not palpable  Back: no tenderness  Respiratory: respiratory effort normal; clear to auscultation  Cardiovascular: S1S2 regular, no murmurs  Abdomen: soft, not tender, not distended, positive BS; no liver or spleen organomegaly  Genitourinary: no suprapubic tenderness  Lymphatic: no LN palpable  Musculoskeletal: no muscle tenderness, no joint swelling or tenderness  Extremities: 1+ pedal edema  Right ankle and lower leg up to below the right knee: erythema, edema, warmth and tenderness; no skin break; no discharge  Neurological/ Psychiatric: AxOx3, judgement and insight normal; moving all extremities  Skin: no rashes; no palpable lesions    Labs: all available labs reviewed                        11. )-----------( 174      ( 20 Dec 2019 07:20 )             36.2     12-20    141  |  110<H>  |  25<H>  ----------------------------<  102<H>  3.7   |  26  |  1.22    Ca    7.7<L>      20 Dec 2019 07:20    TPro  6.2  /  Alb  2.7<L>  /  TBili  0.4  /  DBili  x   /  AST  11<L>  /  ALT  13  /  AlkPhos  49  12-20     LIVER FUNCTIONS - ( 20 Dec 2019 07:20 )  Alb: 2.7 g/dL / Pro: 6.2 gm/dL / ALK PHOS: 49 U/L / ALT: 13 U/L / AST: 11 U/L / GGT: x           Urinalysis Basic - ( 19 Dec 2019 23:08 )    Color: Yellow / Appearance: Clear / S.020 / pH: x  Gluc: x / Ketone: Trace  / Bili: Moderate / Urobili: 1 mg/dL   Blood: x / Protein: 30 mg/dL / Nitrite: Negative   Leuk Esterase: Trace / RBC: 25-50 /HPF / WBC 3-5   Sq Epi: x / Non Sq Epi: Occasional / Bacteria: Few          Radiology: all available radiological tests reviewed    Advanced directives addressed: full resuscitation

## 2019-12-20 NOTE — CONSULT NOTE ADULT - ASSESSMENT
NSTEMI    Suggest:    Cardiac monitor  O2 supplement prn  Treat with aspirin, Plavix  IV Heparin prn  Beta blockers  Statins  Ischemia evaluation - cardiac cath.

## 2019-12-21 ENCOUNTER — TRANSCRIPTION ENCOUNTER (OUTPATIENT)
Age: 84
End: 2019-12-21

## 2019-12-21 VITALS — OXYGEN SATURATION: 98 %

## 2019-12-21 LAB
ANION GAP SERPL CALC-SCNC: 5 MMOL/L — SIGNIFICANT CHANGE UP (ref 5–17)
BASOPHILS # BLD AUTO: 0.03 K/UL — SIGNIFICANT CHANGE UP (ref 0–0.2)
BASOPHILS NFR BLD AUTO: 0.2 % — SIGNIFICANT CHANGE UP (ref 0–2)
BUN SERPL-MCNC: 20 MG/DL — SIGNIFICANT CHANGE UP (ref 7–23)
CALCIUM SERPL-MCNC: 8.1 MG/DL — LOW (ref 8.5–10.1)
CHLORIDE SERPL-SCNC: 111 MMOL/L — HIGH (ref 96–108)
CO2 SERPL-SCNC: 26 MMOL/L — SIGNIFICANT CHANGE UP (ref 22–31)
CREAT SERPL-MCNC: 1.01 MG/DL — SIGNIFICANT CHANGE UP (ref 0.5–1.3)
CULTURE RESULTS: SIGNIFICANT CHANGE UP
EOSINOPHIL # BLD AUTO: 0.09 K/UL — SIGNIFICANT CHANGE UP (ref 0–0.5)
EOSINOPHIL NFR BLD AUTO: 0.7 % — SIGNIFICANT CHANGE UP (ref 0–6)
GLUCOSE SERPL-MCNC: 132 MG/DL — HIGH (ref 70–99)
HCT VFR BLD CALC: 34.7 % — LOW (ref 39–50)
HGB BLD-MCNC: 11.4 G/DL — LOW (ref 13–17)
IMM GRANULOCYTES NFR BLD AUTO: 0.5 % — SIGNIFICANT CHANGE UP (ref 0–1.5)
LYMPHOCYTES # BLD AUTO: 1.35 K/UL — SIGNIFICANT CHANGE UP (ref 1–3.3)
LYMPHOCYTES # BLD AUTO: 11.1 % — LOW (ref 13–44)
MCHC RBC-ENTMCNC: 29.2 PG — SIGNIFICANT CHANGE UP (ref 27–34)
MCHC RBC-ENTMCNC: 32.9 GM/DL — SIGNIFICANT CHANGE UP (ref 32–36)
MCV RBC AUTO: 89 FL — SIGNIFICANT CHANGE UP (ref 80–100)
MONOCYTES # BLD AUTO: 0.84 K/UL — SIGNIFICANT CHANGE UP (ref 0–0.9)
MONOCYTES NFR BLD AUTO: 6.9 % — SIGNIFICANT CHANGE UP (ref 2–14)
NEUTROPHILS # BLD AUTO: 9.75 K/UL — HIGH (ref 1.8–7.4)
NEUTROPHILS NFR BLD AUTO: 80.6 % — HIGH (ref 43–77)
PLATELET # BLD AUTO: 172 K/UL — SIGNIFICANT CHANGE UP (ref 150–400)
POTASSIUM SERPL-MCNC: 3.9 MMOL/L — SIGNIFICANT CHANGE UP (ref 3.5–5.3)
POTASSIUM SERPL-SCNC: 3.9 MMOL/L — SIGNIFICANT CHANGE UP (ref 3.5–5.3)
RBC # BLD: 3.9 M/UL — LOW (ref 4.2–5.8)
RBC # FLD: 14.8 % — HIGH (ref 10.3–14.5)
SODIUM SERPL-SCNC: 142 MMOL/L — SIGNIFICANT CHANGE UP (ref 135–145)
SPECIMEN SOURCE: SIGNIFICANT CHANGE UP
WBC # BLD: 12.12 K/UL — HIGH (ref 3.8–10.5)
WBC # FLD AUTO: 12.12 K/UL — HIGH (ref 3.8–10.5)

## 2019-12-21 PROCEDURE — 93010 ELECTROCARDIOGRAM REPORT: CPT

## 2019-12-21 PROCEDURE — 99232 SBSQ HOSP IP/OBS MODERATE 35: CPT

## 2019-12-21 RX ORDER — LACTOBACILLUS ACIDOPHILUS 100MM CELL
1 CAPSULE ORAL
Qty: 14 | Refills: 0
Start: 2019-12-21 | End: 2019-12-27

## 2019-12-21 RX ORDER — ACETAMINOPHEN 500 MG
2 TABLET ORAL
Qty: 0 | Refills: 0 | DISCHARGE
Start: 2019-12-21

## 2019-12-21 RX ORDER — VERAPAMIL HCL 240 MG
1 CAPSULE, EXTENDED RELEASE PELLETS 24 HR ORAL
Qty: 0 | Refills: 0 | DISCHARGE

## 2019-12-21 RX ORDER — METOPROLOL TARTRATE 50 MG
1 TABLET ORAL
Qty: 60 | Refills: 0
Start: 2019-12-21 | End: 2020-01-19

## 2019-12-21 RX ORDER — IPRATROPIUM/ALBUTEROL SULFATE 18-103MCG
3 AEROSOL WITH ADAPTER (GRAM) INHALATION ONCE
Refills: 0 | Status: COMPLETED | OUTPATIENT
Start: 2019-12-21 | End: 2019-12-21

## 2019-12-21 RX ORDER — ALBUTEROL 90 UG/1
2 AEROSOL, METERED ORAL
Qty: 1 | Refills: 0
Start: 2019-12-21 | End: 2020-01-19

## 2019-12-21 RX ORDER — LISINOPRIL 2.5 MG/1
1 TABLET ORAL
Qty: 30 | Refills: 0
Start: 2019-12-21 | End: 2020-01-19

## 2019-12-21 RX ORDER — ATORVASTATIN CALCIUM 80 MG/1
1 TABLET, FILM COATED ORAL
Qty: 30 | Refills: 0
Start: 2019-12-21 | End: 2020-01-19

## 2019-12-21 RX ORDER — CLOPIDOGREL BISULFATE 75 MG/1
1 TABLET, FILM COATED ORAL
Qty: 30 | Refills: 0
Start: 2019-12-21 | End: 2020-01-19

## 2019-12-21 RX ADMIN — CEFTRIAXONE 2000 MILLIGRAM(S): 500 INJECTION, POWDER, FOR SOLUTION INTRAMUSCULAR; INTRAVENOUS at 12:05

## 2019-12-21 RX ADMIN — Medication 3 MILLILITER(S): at 13:43

## 2019-12-21 RX ADMIN — Medication 25 MILLIGRAM(S): at 05:31

## 2019-12-21 RX ADMIN — Medication 250 MILLIGRAM(S): at 15:26

## 2019-12-21 RX ADMIN — Medication 250 MILLIGRAM(S): at 05:31

## 2019-12-21 RX ADMIN — Medication 81 MILLIGRAM(S): at 12:05

## 2019-12-21 RX ADMIN — CLOPIDOGREL BISULFATE 75 MILLIGRAM(S): 75 TABLET, FILM COATED ORAL at 12:05

## 2019-12-21 NOTE — DISCHARGE NOTE PROVIDER - NSDCCPCAREPLAN_GEN_ALL_CORE_FT
PRINCIPAL DISCHARGE DIAGNOSIS  Diagnosis: Chest pain  Assessment and Plan of Treatment: -Had cardiac cath with ROCAEL placed in RPDA  -Continue ASA, plavix, metoprolol  -Follow up with Cardio within 1 week of discharge      SECONDARY DISCHARGE DIAGNOSES  Diagnosis: Leukocytosis, unspecified type  Assessment and Plan of Treatment: -Complete course of antibiotics    Diagnosis: Shortness of breath  Assessment and Plan of Treatment: -Continue with albuterol inhaler as needed  -Follow up with PCP within 1 week of discharge

## 2019-12-21 NOTE — DISCHARGE NOTE PROVIDER - NSDCMRMEDTOKEN_GEN_ALL_CORE_FT
acetaminophen 325 mg oral tablet: 2 tab(s) orally every 6 hours, As needed, Temp greater or equal to 38C (100.4F), Mild Pain (1 - 3)  Aspirin Low Dose 81 mg oral tablet, chewable: 1 tab(s) orally once a day  Flexeril 10 mg oral tablet: 1 tab(s) orally 3 times a day, As Needed  Flomax 0.4 mg oral capsule: 1 cap(s) orally once a day acetaminophen 325 mg oral tablet: 2 tab(s) orally every 6 hours, As needed, Temp greater or equal to 38C (100.4F), Mild Pain (1 - 3)  albuterol 90 mcg/inh inhalation powder: 2 puff(s) inhaled every 4 hours, As Needed -for shortness of breath and/or wheezing   Aspirin Low Dose 81 mg oral tablet, chewable: 1 tab(s) orally once a day  atorvastatin 20 mg oral tablet: 1 tab(s) orally once a day (at bedtime)  clopidogrel 75 mg oral tablet: 1 tab(s) orally once a day  doxycycline hyclate 100 mg oral tablet: 1 tab(s) orally 2 times a day   Flexeril 10 mg oral tablet: 1 tab(s) orally 3 times a day, As Needed  Flomax 0.4 mg oral capsule: 1 cap(s) orally once a day  lactobacillus acidophilus oral capsule: 1 cap(s) orally 2 times a day (with meals)   lisinopril 2.5 mg oral tablet: 1 tab(s) orally once a day   metoprolol tartrate 25 mg oral tablet: 1 tab(s) orally 2 times a day

## 2019-12-21 NOTE — DISCHARGE NOTE PROVIDER - PROVIDER TOKENS
PROVIDER:[TOKEN:[[id number]],FOLLOWUP:[1 week]],PROVIDER:[TOKEN:[[id number]],FOLLOWUP:[1 week]]

## 2019-12-21 NOTE — PROGRESS NOTE ADULT - SUBJECTIVE AND OBJECTIVE BOX
Cath Lab Nurse Practitioner Note  HPI:  84 year old male patient with pertinent hx of HTN and Cardiomyopathy(not on home medications) presented to the ED complaining of sudden onset fever, chills, and body aches associated with dyspnea on exertion and chest tightness. patient was in his usual state of health less than 12 hours prior to arrival in the ED.  In the ED patient was found to have a WBC of 28, Lactate of 2.3, troponin of 0.281 and a BUN/CR of 26/1.67. Admitted for CP and leukocytosis.     S/P LHC with ROCAEL to RPDA x 1     12/21/19: TELE: SR 80-90 bpm  EKG: SR 76 bpm, prolonged  ms    MEDICATIONS  (STANDING):  aspirin  chewable 81 milliGRAM(s) Oral daily  atorvastatin 20 milliGRAM(s) Oral at bedtime  cefTRIAXone Injectable. 2000 milliGRAM(s) IV Push every 24 hours  clopidogrel Tablet 75 milliGRAM(s) Oral daily  metoprolol tartrate 25 milliGRAM(s) Oral two times a day  sodium chloride 0.9%. 1000 milliLiter(s) (75 mL/Hr) IV Continuous <Continuous>  vancomycin  IVPB 750 milliGRAM(s) IV Intermittent every 12 hours    MEDICATIONS  (PRN):  acetaminophen   Tablet .. 650 milliGRAM(s) Oral every 6 hours PRN Temp greater or equal to 38C (100.4F), Mild Pain (1 - 3)    Allergies    No Known Allergies    Vital Signs Last 24 Hrs  T(C): 36.7 (21 Dec 2019 05:29), Max: 37.2 (20 Dec 2019 21:11)  T(F): 98 (21 Dec 2019 05:29), Max: 99 (20 Dec 2019 21:11)  HR: 87 (21 Dec 2019 05:29) (82 - 90)  BP: 161/63 (21 Dec 2019 05:29) (110/59 - 161/63)  BP(mean): --  RR: 19 (21 Dec 2019 05:29) (16 - 20)  SpO2: 96% (21 Dec 2019 05:29) (95% - 99%)                          11.4   12.12 )-----------( 172      ( 21 Dec 2019 07:19 )             34.7     12-21    142  |  111<H>  |  20  ----------------------------<  132<H>  3.9   |  26  |  1.01    Ca    8.1<L>      21 Dec 2019 07:19    TPro  6.2  /  Alb  2.7<L>  /  TBili  0.4  /  DBili  x   /  AST  11<L>  /  ALT  13  /  AlkPhos  49  12-20    CARDIAC MARKERS ( 20 Dec 2019 14:33 )  0.413 ng/mL / x     / x     / x     / x      CARDIAC MARKERS ( 20 Dec 2019 07:20 )  0.282 ng/mL / x     / x     / x     / x      CARDIAC MARKERS ( 20 Dec 2019 03:08 )  0.290 ng/mL / x     / x     / x     / x      CARDIAC MARKERS ( 20 Dec 2019 00:38 )  0.281 ng/mL / x     / x     / x     / x      CARDIAC MARKERS ( 19 Dec 2019 20:22 )  0.244 ng/mL / x     / x     / x     / x        ROS: feels weak.  Denies CP, SOB, palpitations, dizziness or lightheadedness.    PHYSICAL EXAM:    General: WN/WD NAD  HEENT: NC/AT, neck supple, no JVD, EOMI  Neurology: A&Ox3, nonfocal, FALLON x 4  Respiratory: decreased BS B/L, + expiratory wheezes  CV: RRR, S1S2, no murmurs, rubs or gallops  Abdominal: Soft, NT, ND +BS,   Extremities: 1+ edema bilat LEs, right groin site is soft, no bleeding or hematoma noted.  Peripheral pulses 2+       EXAM:  CARDIAC CATHERIZATION         PROCEDURE DATE:  12/20/2019        INTERPRETATION:  Cardiac Diagnostic + PCI Report     Demographics     Patient Name       MEIR ART Height                65 Inches     Impression     Diagnostic Conclusions   One Vessel coronary arterydisease (RCA) .   Normal LV systolic function. Estimated LV ejection fraction is 70 %.   No aortic valve stenosis.     Interventional Conclusions     Successful Coronary Intervention ROCAEL of ostial RPDA.     Recommendations     Percutaneous coronary intervention of RCA today - infarct related artery.     Aggressive medical management of coronary artery disease and its   underlying risk factors.     DAPT.    Estimated Blood Loss:5ml.    Complications:  No complication.     Signatures     ----------------------------------------------------------------   Electronically signed by Rhonda Grover MD(Performing   Physician) on 12/20/2019 12:18

## 2019-12-21 NOTE — DISCHARGE NOTE NURSING/CASE MANAGEMENT/SOCIAL WORK - PATIENT PORTAL LINK FT
You can access the FollowMyHealth Patient Portal offered by [facility] by registering at the following website: [email or web address]. By joining [email or web address]’s FollowMyHealth portal, you will also be able to view your health information using other applications (apps) compatible with our system.

## 2019-12-21 NOTE — PROGRESS NOTE ADULT - ASSESSMENT
84 year old male patient with pertinent hx of HTN and Cardiomyopathy(not on home medications) presented to the ED complaining of sudden onset fever, chills, and body aches associated with dyspnea on exertion and chest tightness. patient was in his usual state of health less than 12 hours prior to arrival in the ED.  In the ED patient was found to have a WBC of 28, Lactate of 2.3, troponin of 0.281 and a BUN/CR of 26/1.67. Admitted for CP and leukocytosis. Referred for cardiac catheterization for evaluation   S/P LHC with ROCAEL to RPDA x 1     Continue ASA 81mg daily  plavix 75mg, lipitor 20mg at bedtime, lopressor 25mg BID  Discussed therapeutic lifestyle changes to reduce risk factors such as following a cardiac diet, weight loss, maintaining a healthy weight, exercise, medication compliance, and regular follow-up  with PCP/Cardioloigst.

## 2019-12-21 NOTE — DISCHARGE NOTE PROVIDER - CARE PROVIDER_API CALL
Rhonda Grover)  Cardiology; Interventional Cardiology  [address] [facility] [address] [place] [address]  Phone: [phone]  Fax: [phone]  Follow Up Time: 1 week    Dharmesh Lim)  Family Medicine  [address] [place] [address]  Phone: [phone]  Fax: [phone]  Follow Up Time: 1 week

## 2019-12-21 NOTE — PROGRESS NOTE ADULT - SUBJECTIVE AND OBJECTIVE BOX
CURRENT CARDIAC WORKUP:       Echo:  Stress Test:  Cardiac Cath:    Allergies:   No Known Allergies      MEDICATIONS  (STANDING):  aspirin  chewable 81 milliGRAM(s) Oral daily  atorvastatin 20 milliGRAM(s) Oral at bedtime  cefTRIAXone Injectable. 2000 milliGRAM(s) IV Push every 24 hours  clopidogrel Tablet 75 milliGRAM(s) Oral daily  metoprolol tartrate 25 milliGRAM(s) Oral two times a day  sodium chloride 0.9%. 1000 milliLiter(s) (75 mL/Hr) IV Continuous <Continuous>  vancomycin  IVPB 750 milliGRAM(s) IV Intermittent every 12 hours    MEDICATIONS  (PRN):  acetaminophen   Tablet .. 650 milliGRAM(s) Oral every 6 hours PRN Temp greater or equal to 38C (100.4F), Mild Pain (1 - 3)      ROS:     .ros      Vital Signs Last 24 Hrs  T(C): 36.7 (21 Dec 2019 05:29), Max: 37.2 (20 Dec 2019 21:11)  T(F): 98 (21 Dec 2019 05:29), Max: 99 (20 Dec 2019 21:11)  HR: 87 (21 Dec 2019 05:29) (82 - 90)  BP: 161/63 (21 Dec 2019 05:29) (110/59 - 161/63)  RR: 19 (21 Dec 2019 05:29) (16 - 20)  SpO2: 96% (21 Dec 2019 05:29) (95% - 99%)    I&O's Summary    20 Dec 2019 07:01  -  21 Dec 2019 07:00  --------------------------------------------------------  IN: 0 mL / OUT: 850 mL / NET: -850 mL        PHYSICAL EXAM:    .phy      TELEMETRY:    ECG:    LABS:                        11.4   12.12 )-----------( 172      ( 21 Dec 2019 07:19 )             34.7     12-    142  |  111<H>  |  20  ----------------------------<  132<H>  3.9   |  26  |  1.01    Ca    8.1<L>      21 Dec 2019 07:19    TPro  6.2  /  Alb  2.7<L>  /  TBili  0.4  /  DBili  x   /  AST  11<L>  /  ALT  13  /  AlkPhos  49   @ 14:33  Trop-I 0.413     @ 07:20  Trop-I 0.282     @ 03:08  Trop-I 0.290     @ 00:38  Trop-I 0.281     @ 20:22  Trop-I 0.244    Pro BNP  1831  @ 20:22  D Dimer  606  @ 20:22    PT/INR - ( 19 Dec 2019 20:22 )   PT: 13.5 sec;   INR: 1.21 ratio         PTT - ( 19 Dec 2019 20:22 )  PTT:36.8 sec  Urinalysis Basic - ( 19 Dec 2019 23:08 )    Color: Yellow / Appearance: Clear / S.020 / pH: x  Gluc: x / Ketone: Trace  / Bili: Moderate / Urobili: 1 mg/dL   Blood: x / Protein: 30 mg/dL / Nitrite: Negative   Leuk Esterase: Trace / RBC: 25-50 /HPF / WBC 3-5   Sq Epi: x / Non Sq Epi: Occasional / Bacteria: Few            RADIOLOGY & ADDITIONAL STUDIES: 83 yo male with c/o sudden onset chills, low grade fever, body aches. Felt weak. BP noted to be low. He then c/o chest tightness and dyspnea on exertion. Chest pain / tightness continued. Noted to have hypotension and tachycardia while in the hospital. Per daughter who is also acting as a  he has been having progressive dyspnea and chest tightness on exertion. Noted to have abnormal CE. Cardiac cath revealed RPDA stenosis and patient had ROCAEL of the RPDA    Today, he feels tired but other wise no specific complaints.       PAST MEDICAL & SURGICAL HISTORY:  HTN (hypertension)  Hypertrophic Cardiomyopathy  History of colonoscopy    CURRENT CARDIAC WORKUP:       Cardiac Cath: 19 ROCAEL of the ostial RPDA    Allergies:   No Known Allergies      MEDICATIONS  (STANDING):  aspirin  chewable 81 milliGRAM(s) Oral daily  atorvastatin 20 milliGRAM(s) Oral at bedtime  cefTRIAXone Injectable. 2000 milliGRAM(s) IV Push every 24 hours  clopidogrel Tablet 75 milliGRAM(s) Oral daily  metoprolol tartrate 25 milliGRAM(s) Oral two times a day  sodium chloride 0.9%. 1000 milliLiter(s) (75 mL/Hr) IV Continuous <Continuous>  vancomycin  IVPB 750 milliGRAM(s) IV Intermittent every 12 hours    MEDICATIONS  (PRN):  acetaminophen   Tablet .. 650 milliGRAM(s) Oral every 6 hours PRN Temp greater or equal to 38C (100.4F), Mild Pain (1 - 3)      ROS:     Detailed ten system ROS was performed and was negative except for history as eluded to above.    no fever  no chills  no nausea  no vomiting  no diarrhea  no constipation  no melena  no hematochezia  no chest pain  no palpitations  no sob at rest  no dyspnea on exertion  no cough  no wheezing  no anorexia  no headache  no dizziness  no syncope  + weakness  no myalgia  no dysuria  no polyuria  no hematuria       Vital Signs Last 24 Hrs  T(C): 36.7 (21 Dec 2019 05:29), Max: 37.2 (20 Dec 2019 21:11)  T(F): 98 (21 Dec 2019 05:29), Max: 99 (20 Dec 2019 21:11)  HR: 87 (21 Dec 2019 05:29) (82 - 90)  BP: 161/63 (21 Dec 2019 05:29) (110/59 - 161/63)  RR: 19 (21 Dec 2019 05:29) (16 - 20)  SpO2: 96% (21 Dec 2019 05:29) (95% - 99%)    I&O's Summary    20 Dec 2019 07:01  -  21 Dec 2019 07:00  --------------------------------------------------------  IN: 0 mL / OUT: 850 mL / NET: -850 mL        PHYSICAL EXAM:    General:                Comfortable, AAO X 3, in no distress. Obese  HEENT:                  Atraumatic, PERRLA, EOMI, conjunctiva clear.    Neck:                     Supple, no adenopathy, no thyromegaly, no JVD, no bruit.  Chest:                    Clear, B/L symmetric air entry, no tachypnea  Heart:                     S1, S2 normal,  no murmur.  Abdomen:              Soft, non-tender, bowel sounds active. No palpable masses.  Extremities:           no cyanosis, + edema. Peripheral pulses normal.  Skin:                      Skin color, texture normal. No rashes.  Neurologic:            Grossly nonfocal.       TELEMETRY:  Normal sinus rhythm with no tachy or angel events     ECG:  NSR, first deg AV block, no ST T changes of ischemia or infarction.       LABS:                        11.4   12.12 )-----------( 172      ( 21 Dec 2019 07:19 )             34.7         142  |  111<H>  |  20  ----------------------------<  132<H>  3.9   |  26  |  1.01    Ca    8.1<L>      21 Dec 2019 07:19    TPro  6.2  /  Alb  2.7<L>  /  TBili  0.4  /  DBili  x   /  AST  11<L>  /  ALT  13  /  AlkPhos  49  -      12- @ 14:33  Trop-I 0.413     @ 07:20  Trop-I 0.282     @ 03:08  Trop-I 0.290     @ 00:38  Trop-I 0.281     @ 20:22  Trop-I 0.244    Pro BNP  1831  @ 20:22  D Dimer  606  @ 20:22    PT/INR - ( 19 Dec 2019 20:22 )   PT: 13.5 sec;   INR: 1.21 ratio         PTT - ( 19 Dec 2019 20:22 )  PTT:36.8 sec  Urinalysis Basic - ( 19 Dec 2019 23:08 )    Color: Yellow / Appearance: Clear / S.020 / pH: x  Gluc: x / Ketone: Trace  / Bili: Moderate / Urobili: 1 mg/dL   Blood: x / Protein: 30 mg/dL / Nitrite: Negative   Leuk Esterase: Trace / RBC: 25-50 /HPF / WBC 3-5   Sq Epi: x / Non Sq Epi: Occasional / Bacteria: Few      RADIOLOGY & ADDITIONAL STUDIES:    CT Angio Chest PE Protocol w/ IV Cont (19 @ 22:58) >  IMPRESSION:  No pulmonary embolism.

## 2019-12-21 NOTE — DISCHARGE NOTE PROVIDER - HOSPITAL COURSE
84 year old male patient with pertinent hx of HTN and Cardiomyopathy(not on home medications) presented to the ED complaining of sudden onset fever, chills, and body aches associated with dyspnea on exertion and chest tightness. patient was in his usual state of health less than 12 hours prior to arrival in the ED.  Patient endorsed feeling as though he had a fever but denies and coughing, runny nose, congestion, diarrhea, abdominal pain, skin rashes, headache or dizziness. Patient seen and evaluated at the office of his PCP prior to ED arrival and was referred to the ED for hypotension and tachycardia.            In the ED patient was found to have a WBC of 28, Lactate of 2.3, troponin of 0.281 and a BUN/CR of 26/1.67        12/21/19- Patient seen and examined at bedside. States he feels much better.  S/p cardiac cath on 12/20 with ROCAEL to PRDA. Patient denies any CP, SOB, abd pain, eager to go home.        Physical Exam:    General: Well developed, well nourished, NAD    Neurology: A&Ox3, nonfocal, CN II-XII grossly intact    Respiratory: CTA B/L, No W/R/R    CV: RRR, +S1/S2, +murmur    Abdominal: Soft, NT, ND +BSx4    Extremities: + peripheral pulses     Skin: +improving erythema RLE        #Chest pain    #Elevated troponin    -CTA chest negative for PE/ effusions/volume overload state    -monitor on telemetry for arrhythmias    -no acute EKG changes noted    -Cardiology consult    -s/p Cath- ROCAEL to RPDA, continue asa, plavix, bblocker        #SIRS    #Cellulitis    -Leukocytosis resolving    -Continue abx- switch to PO abx on D/C, cellulitis RLE improved        #Transient hypotension    -likely related to pre-renal state    -bp corrected with IVF hydration    -closely monitor bp        #Leukocytosis    -trend cbc- improving    -follow up blood and urine cx        #Elevated D-Dimer    -CTA chest negative for PE        #BRI- resolved    -likely pre-renal    -daughter endorsed poor PO intake    -IVF hydration    -trend kidney function        #HTN    -stable    -started on Bblocker here            Total time spent on discharge including coordination of care: 46 minutes 84 year old male patient with pertinent hx of HTN and Cardiomyopathy(not on home medications) presented to the ED complaining of sudden onset fever, chills, and body aches associated with dyspnea on exertion and chest tightness. patient was in his usual state of health less than 12 hours prior to arrival in the ED.  Patient endorsed feeling as though he had a fever but denies and coughing, runny nose, congestion, diarrhea, abdominal pain, skin rashes, headache or dizziness. Patient seen and evaluated at the office of his PCP prior to ED arrival and was referred to the ED for hypotension and tachycardia.            In the ED patient was found to have a WBC of 28, Lactate of 2.3, troponin of 0.281 and a BUN/CR of 26/1.67        12/21/19- Patient seen and examined at bedside. States he feels much better.  S/p cardiac cath on 12/20 with ROCAEL to PRDA. Patient denies any CP, SOB, abd pain, eager to go home.        Physical Exam:    General: Well developed, well nourished, NAD    Neurology: A&Ox3, nonfocal, CN II-XII grossly intact    Respiratory: CTA B/L, No W/R/R    CV: RRR, +S1/S2, +murmur    Abdominal: Soft, NT, ND +BSx4    Extremities: + peripheral pulses     Skin: +improving erythema RLE        #Chest pain, MI    #Elevated troponin    -CTA chest negative for PE/ effusions/volume overload state    -monitor on telemetry for arrhythmias    -no acute EKG changes noted    -Cardiology consult    -s/p Cath- ROCAEL to RPDA, continue asa, plavix, bblocker        #SIRS    #Cellulitis    -Leukocytosis resolving    -Continue abx- switch to PO abx on D/C, cellulitis RLE improved        #Transient hypotension    -likely related to pre-renal state    -bp corrected with IVF hydration    -closely monitor bp        #Leukocytosis    -trend cbc- improving    -follow up blood and urine cx        #Elevated D-Dimer    -CTA chest negative for PE        #BRI- resolved    -likely pre-renal    -daughter endorsed poor PO intake    -IVF hydration    -trend kidney function        #HTN    -stable    -started on Bblocker here            Total time spent on discharge including coordination of care: 46 minutes

## 2019-12-21 NOTE — PROGRESS NOTE ADULT - ASSESSMENT
optimize BP  Discharge home NSTEMI  CAD, s/p ROCAEL PCI of the ostial RPDA  HTN  Obesity  ? URI  Cellulitis of LE     Suggest:    Continue current medications.   DAPT  optimize BP  ABx for cellulitis.  Out pt venous Doppler at my office to assess for venous insufficiency.   Discharge home

## 2019-12-25 LAB
CULTURE RESULTS: SIGNIFICANT CHANGE UP
CULTURE RESULTS: SIGNIFICANT CHANGE UP
SPECIMEN SOURCE: SIGNIFICANT CHANGE UP
SPECIMEN SOURCE: SIGNIFICANT CHANGE UP

## 2019-12-26 DIAGNOSIS — L03.115 CELLULITIS OF RIGHT LOWER LIMB: ICD-10-CM

## 2019-12-26 DIAGNOSIS — E78.00 PURE HYPERCHOLESTEROLEMIA, UNSPECIFIED: ICD-10-CM

## 2019-12-26 DIAGNOSIS — I25.10 ATHEROSCLEROTIC HEART DISEASE OF NATIVE CORONARY ARTERY WITHOUT ANGINA PECTORIS: ICD-10-CM

## 2019-12-26 DIAGNOSIS — I21.11 ST ELEVATION (STEMI) MYOCARDIAL INFARCTION INVOLVING RIGHT CORONARY ARTERY: ICD-10-CM

## 2019-12-26 DIAGNOSIS — R65.10 SYSTEMIC INFLAMMATORY RESPONSE SYNDROME (SIRS) OF NON-INFECTIOUS ORIGIN WITHOUT ACUTE ORGAN DYSFUNCTION: ICD-10-CM

## 2019-12-26 DIAGNOSIS — I42.2 OTHER HYPERTROPHIC CARDIOMYOPATHY: ICD-10-CM

## 2019-12-26 DIAGNOSIS — E66.9 OBESITY, UNSPECIFIED: ICD-10-CM

## 2019-12-26 DIAGNOSIS — I10 ESSENTIAL (PRIMARY) HYPERTENSION: ICD-10-CM

## 2019-12-26 DIAGNOSIS — N17.9 ACUTE KIDNEY FAILURE, UNSPECIFIED: ICD-10-CM

## 2020-03-16 ENCOUNTER — TRANSCRIPTION ENCOUNTER (OUTPATIENT)
Age: 85
End: 2020-03-16

## 2020-03-28 NOTE — CDI QUERY NOTE - NSCDIOTHERTXTBX_GEN_ALL_CORE_HH
Documentation on chart of NSTEMI, BRI, and Cellulitis of RLE.     ED documentation:   In the ED patient was found to have a WBC of 28, Lactate of 2.3, troponin of 0.281, chills and a BUN/CR of 26/1.67.  - RR 24    CODE Sepsis in ED  ED Provider: Sepsis present on admission - YES and also documented by kuhn snot meet Sepsis criteria    Hospitalist Progress note: SIRS - Cellulitis    ID: Probable Sepsis - RLE Cellulitis     1.9 L fluid in ED.    Antibiotics : Vancomycin, Rocephin, and Zithromax.     Please clarify a diagnosis based on the above clinical criteria.     STATUS:  Sepsis ruled in  Sepsis is resolving  Sepsis ruled out  Early Sepsis POA, resolved?    PRESENT ON ADMISSION:  Was sepsis present on admission?  If so, please document.

## 2020-06-21 ENCOUNTER — TRANSCRIPTION ENCOUNTER (OUTPATIENT)
Age: 85
End: 2020-06-21

## 2021-04-27 ENCOUNTER — NON-APPOINTMENT (OUTPATIENT)
Age: 86
End: 2021-04-27

## 2021-04-27 ENCOUNTER — APPOINTMENT (OUTPATIENT)
Dept: OPHTHALMOLOGY | Facility: CLINIC | Age: 86
End: 2021-04-27
Payer: MEDICARE

## 2021-04-27 PROCEDURE — 92136 OPHTHALMIC BIOMETRY: CPT

## 2021-04-27 PROCEDURE — 92014 COMPRE OPH EXAM EST PT 1/>: CPT

## 2021-04-27 PROCEDURE — 92133 CPTRZD OPH DX IMG PST SGM ON: CPT

## 2021-04-27 PROCEDURE — 99072 ADDL SUPL MATRL&STAF TM PHE: CPT

## 2021-06-11 NOTE — CDI QUERY NOTE - NSCDINOTEDOCCLARIFICATION_GEN_A_CORE
PLEASE INCLUDE MORE SPECIFIC DOCUMENTATION IN YOUR PROGRESS NOTE AND DISCHARGE SUMMARY.  The documentation in this patient's medical record requires additional clarification to ensure that we accurately capture the patients diagnosis(es), treatment and/or severity of illness. Please document to the greatest level of specificity all corresponding diagnoses (either known or suspected) and/or treatment associated with the clinical information described below. 80F with PMHx of COPD on home O2, CHF, RA, spinal stenosis, diverticulitis, alcoholism (quit 23yrs ago), Meniere's disease, GERD, SIMÓN, skin CA, HH, PVD, breast CA sp lumpectomy, TIA, HLD, sp AVR who presented to ED with fever, chills, and cough. Pt admitted to recent treatment with PO abx as outpt. Subsequently with abdominal pain. Pt with sepsis criteria. Was treated with IV abx and fluids. Imaging revealed a RML infiltrate and diverticulitis. Pt with low BP in ED after initial IVF. ICU was consulted and following repeat fluids pts BP improved. Pt was admitted to SICU.    Last 24hours: Pt recently transferred from ED to SICU. Upon arrival pt noted to have low SBP 80s with quick rebound. Resident team evaluated and now requesting reconsult for evaluation. Pts BP upon exam with SBP>100, no tachycardia, afebrile. POCUS exam revealed no gross LV dysfunction, no PE, RV nml, IVC is collapsable. Pt denies CP, SOB, N/V, blurry vision, dizziness, diarrhea. She does admit to some ongoing vague abd pain.               EXAM:  CT ABDOMEN AND PELVIS IC                            PROCEDURE DATE:  06/10/2021          INTERPRETATION:  CLINICAL INFORMATION: Left lower quadrant abdominal pain. Evaluate for acute diverticulitis.    COMPARISON: 8/10/2020    PROCEDURE:  CT of the Abdomen and Pelvis was performed with intravenous contrast.  Intravenous contrast: 90 ml Omnipaque 350. 10 ml discarded.  Oral contrast: None.  Sagittal and coronal reformats were performed.    FINDINGS:    LOWER CHEST: Cardiomegaly. Interlobular septal thickening, consistent with changes of CHF. Bibasilar atelectasis.    LIVER: Within normal limits.  BILE DUCTS: Normal caliber.  GALLBLADDER: Within normal limits.  SPLEEN: Within normal limits.  PANCREAS: Within normal limits.  ADRENALS: Within normal limits.  KIDNEYS/URETERS: Within normal limits.    BLADDER: Within normal limits.  REPRODUCTIVE ORGANS: Uterus and bilateral adnexa within normal limits.    BOWEL: No bowel obstruction. Appendix is not visualized. Diverticulosis with changes of acute diverticulitis at the level of the mid sigmoid colon.  PERITONEUM: No ascites. No evidence of diverticular abscess. No pneumoperitoneum.  VESSELS:  Atherosclerotic calcifications.  RETROPERITONEUM: No lymphadenopathy.  ABDOMINAL WALL: Within normal limits.  BONES: Degenerative changes and scoliosis. Diffuse osteopenia. Spinal stimulator.    IMPRESSION:  *  Acute diverticulitis involving the mid sigmoid colon. No evidence of diverticular abscess.  *  Cardiomegaly and CHF at the lung bases.                FARRUKH THOMAS MD; Attending Radiologist  This document has been electronically signed. Duran 10 2021  4:43PM    EXAM:  XR CHEST PORTABLE IMMED 1V                            PROCEDURE DATE:  06/10/2021          INTERPRETATION:  AP erect chest on Polina 10, 2021 at 1:38 PM. Patient has sepsis.    Heart magnified by technique. Spinal stimulator from below and sternotomy again noted.    On August 10, 2020 there is a baseline slight interstitial prominence in which is again seen.    The present film shows a vague density right mid lateral lung field likely an infiltrate.    IMPRESSION: Small right mid lateral lung infiltrate. Other findings stable as above.            OUMAR BRADLEY MD; Attending Radiologist  This document has been electronically signed. Duran 10 2021  1:45PM     EXAM:  ECHO TTE WO CON COMP W DOPPLR         PROCEDURE DATE:  04/28/2019        INTERPRETATION:  INDICATION: Dyspnea    Blood Pressure 100/67    Height 159 cm     Weight 63 kg       BSA 1.65 sq   m    Dimensions:    LA 3.7       Normal Values: 2.0 - 4.0 cm    Ao 3.3        Normal Values: 2.0 - 3.8 cm  SEPTUM 1.3       Normal Values: 0.6 - 1.2 cm  PWT 1.3       Normal Values: 0.6 - 1.1 cm  LVIDd 4.2         Normal Values: 3.0 - 5.6 cm  LVIDs 3.2         Normal Values: 1.8 - 4.0 cm      OBSERVATIONS:    Technically difficult study  Mitral Valve: Mitral annular calcification is present with thickened   mitral valve leaflets, moderate mitral regurgitation   Aortic Valve/Aorta: Well-seated aortic valve bioprosthesis with mildly   elevated gradients. Peak aortic valve gradient is 17.8 mmHg with a mean   aortic gradient of 9.5 mmHg.  Tricuspid Valve: normal with trace TR.  Pulmonic Valve: Not well visualized  Left Atrium: Mildly dilated  Right Atrium: Not well visualized  Left Ventricle: endocardium not well visualized. Ejection fraction   appears to be about 40%. Cannot comment on segmental LV dysfunction   although there appears to be apical hypokinesis. Mild left ventricular   hypertrophy is present  Right Ventricle: Not well visualized but systolic function appears mildly   decreased  Pericardium/Pleura: normal, no significant pericardial effusion.  Pulmonary/RV Pressure: estimated PA systolic pressure of 28.3 mmHg   LV diastolic dysfunction is present    Conclusion:  Technically difficult study  Left ventricular endocardium not well visualized. Ejection fraction   appears to be about 40%. Cannot comment on segmental LV dysfunction   although there appears to be apical hypokinesis. Mild left ventricular   hypertrophy is present  Well-seated aortic valve bioprosthesis with mildly elevated gradients.   Peak aortic valve gradient is 17.8 mmHg with a mean aortic gradient of   9.5 mmHg.  Mitral annular calcification is present with thickened mitral valve   leaflets, moderate mitral regurgitation is present                  JOSÉ MANUEL DE LOS SANTOS   This document has been electronically signed. Apr 29 2019  8:18AM Patient is a 80y old  Female who presents with a chief complaint of Sepsis, PNA (2021 04:17)      BRIEF HOSPITAL COURSE:  80F with PMHx of COPD on home O2/steroids, CHF, RA, spinal stenosis, diverticulitis, alcoholism (quit 23yrs ago), Meniere's disease, GERD, SIMÓN, skin CA, HH, PVD, breast CA sp lumpectomy, TIA, HLD, sp AVR who presented to ED with fever, chills, and cough. Pt admitted to recent treatment with PO abx as outpt. Subsequently with abdominal pain. Pt with sepsis criteria. Was treated with IV abx and fluids. Imaging revealed a RML infiltrate and diverticulitis. Pt with low BP in ED after initial IVF. ICU was consulted and following repeat fluids pts BP improved. Pt was admitted to SICU.    Last 24hours: Pt recently transferred from ED to SICU. Upon arrival pt noted to have low SBP 80s with quick rebound. Resident team evaluated and now requesting reconsult for evaluation. Pts BP upon exam with SBP>100, no tachycardia, afebrile. POCUS exam revealed no gross LV dysfunction, no PE, RV nml, IVC is collapsable. Pt denies CP, SOB, N/V, blurry vision, dizziness, diarrhea. She does admit to some ongoing vague abd pain.       PAST MEDICAL & SURGICAL HISTORY:  Valvular heart disease  aortic and mitral    Hyperlipidemia    TIA (transient ischemic attack)  2010    Breast cancer  right s/p lumpectomy and radiation     Hiatal hernia  s/p surgical repair     PVD (peripheral vascular disease)  left iliac  s/p surgical intervention -  unsuccessful    HTN (hypertension)    COPD (chronic obstructive pulmonary disease)  diagnosed   inhaler and nebulizer    Borderline diabetes  no meds    Depression    RA (rheumatoid arthritis)  diagnosed   oxycodone prn  neck/ lower back    Anxiety    Thrush  treated x 4 days  1 month ago  restarted medication  3-19-14 clortramazole 5x day    SIMÓN (obstructive sleep apnea)  category I severe pt does not use c/pap    Diverticulitis  hospitalized     TMJ (temporomandibular joint disorder)    LBBB (left bundle branch block)    CHF (congestive heart failure)    Spinal stenosis    Emphysema lung    GERD (gastroesophageal reflux disease)    Dry eyes    Glaucoma    Diabetes mellitus    Meniere disease    Aortic valve replaced  with bovine heart valve    Skin cancer  not melanoma    Alcoholism  last drink     Incontinence    S/P  section  x2 /     S/P appendectomy      S/P rotator cuff surgery  left 2004    S/P hernia surgery  left inguinal age 12    Hiatal hernia  s/p surgical repair per pt 2005    S/P lumpectomy, right breast  2008    S/P carpal tunnel release  right 2002    PVD (peripheral vascular disease)  s/p left iliac bypass which was unsuccessful  open repair    S/P AVR (aortic valve replacement)  about 5 yrs ago (2013?)    H/O sinus surgery    H/O:  section  2x        Review of Systems:  12pt ROS negative unless otherwise stated above      Medications:  azithromycin  IVPB 500 milliGRAM(s) IV Intermittent every 24 hours  azithromycin  IVPB      aztreonam  IVPB 2000 milliGRAM(s) IV Intermittent every 8 hours  aztreonam  IVPB      clotrimazole Lozenge 1 Lozenge Oral <User Schedule>  metroNIDAZOLE  IVPB 500 milliGRAM(s) IV Intermittent every 8 hours  vancomycin  IVPB 1000 milliGRAM(s) IV Intermittent once    midodrine. 10 milliGRAM(s) Oral every 8 hours    ALBUTerol    90 MICROgram(s) HFA Inhaler 2 Puff(s) Inhalation every 6 hours PRN  budesonide 160 MICROgram(s)/formoterol 4.5 MICROgram(s) Inhaler 2 Puff(s) Inhalation two times a day  guaiFENesin  milliGRAM(s) Oral every 12 hours PRN  tiotropium 18 MICROgram(s) Capsule 1 Capsule(s) Inhalation daily    ARIPiprazole 2 milliGRAM(s) Oral daily  clonazePAM  Tablet 0.5 milliGRAM(s) Oral two times a day  ondansetron Injectable 4 milliGRAM(s) IV Push every 6 hours PRN  oxycodone    5 mG/acetaminophen 325 mG 2 Tablet(s) Oral every 4 hours PRN  venlafaxine XR. 150 milliGRAM(s) Oral daily      aspirin  chewable 81 milliGRAM(s) Oral daily  heparin   Injectable 5000 Unit(s) SubCutaneous every 12 hours    linaclotide 145 MICROGram(s) Oral before breakfast  lubiprostone 24 MICROGram(s) Oral two times a day  pantoprazole    Tablet 40 milliGRAM(s) Oral before breakfast  psyllium Powder 1 Packet(s) Oral daily  sulfaSALAzine 500 milliGRAM(s) Oral three times a day      dextrose 40% Gel 15 Gram(s) Oral once  dextrose 50% Injectable 25 Gram(s) IV Push once  dextrose 50% Injectable 12.5 Gram(s) IV Push once  dextrose 50% Injectable 25 Gram(s) IV Push once  glucagon  Injectable 1 milliGRAM(s) IntraMuscular once  hydrocortisone sodium succinate Injectable 50 milliGRAM(s) IV Push every 6 hours  insulin lispro (ADMELOG) corrective regimen sliding scale   SubCutaneous Before meals and at bedtime  simvastatin 20 milliGRAM(s) Oral at bedtime    dextrose 5%. 1000 milliLiter(s) IV Continuous <Continuous>  dextrose 5%. 1000 milliLiter(s) IV Continuous <Continuous>  sodium chloride 0.9% Bolus 500 milliLiter(s) IV Bolus once  sodium chloride 0.9% Bolus 500 milliLiter(s) IV Bolus once  sodium chloride 0.9%. 500 milliLiter(s) IV Continuous <Continuous>      artificial  tears Solution 1 Drop(s) Both EYES four times a day PRN  dorzolamide 2% Ophthalmic Solution 1 Drop(s) Both EYES three times a day  dorzolamide 2% Ophthalmic Solution      latanoprost 0.005% Ophthalmic Solution 1 Drop(s) Both EYES at bedtime    lactobacillus acidophilus 1 Tablet(s) Oral daily          ICU Vital Signs Last 24 Hrs  T(C): 37.2 (2021 02:30), Max: 40.7 (10 Duran 2021 13:10)  T(F): 98.9 (2021 02:30), Max: 105.3 (10 Duran 2021 13:10)  HR: 97 (2021 02:30) (94 - 126)  BP: 95/50 (2021 02:30) (74/39 - 141/62)  BP(mean): 62 (2021 02:30) (53 - 75)  ABP: --  ABP(mean): --  RR: 20 (2021 02:30) (16 - 24)  SpO2: 98% (2021 02:30) (95% - 100%)                LABS:                        12.8   7.75  )-----------( 200      ( 10 Duran 2021 13:22 )             39.9     06-10    132<L>  |  99  |  13  ----------------------------<  137<H>  3.7   |  27  |  0.66    Ca    8.6      10 Duran 2021 13:22    TPro  7.8  /  Alb  3.9  /  TBili  1.0  /  DBili  x   /  AST  30  /  ALT  46  /  AlkPhos  72  06-10          CAPILLARY BLOOD GLUCOSE        PT/INR - ( 10 Duran 2021 13:22 )   PT: 15.8 sec;   INR: 1.37 ratio         PTT - ( 10 Duran 2021 13:22 )  PTT:27.0 sec  Urinalysis Basic - ( 10 Duran 2021 17:07 )    Color: Yellow / Appearance: Clear / S.015 / pH: x  Gluc: x / Ketone: Negative  / Bili: Negative / Urobili: Negative mg/dL   Blood: x / Protein: 30 mg/dL / Nitrite: Negative   Leuk Esterase: Trace / RBC: 0-2 /HPF / WBC 0-2   Sq Epi: x / Non Sq Epi: Occasional / Bacteria: Occasional      CULTURES:  Rapid RVP Result: NotDetec (06-10 @ 23:32)      Physical Examination:    General: No acute distress.  Alert, oriented, interactive, nonfocal    HEENT: Pupils equal, reactive to light.  Symmetric, sclera anicteric    PULM: mildly diminished BS bilaterally, no wheezing or rales noted    CVS: Regular rate and rhythm    ABD: obese, Softly distended, +BS, LLQ pain with deep palpation, no rebound     EXT: No edema, nontender    SKIN: Warm and well perfused, no rashes noted.    RADIOLOGY:     EXAM:  CT ABDOMEN AND PELVIS IC                            PROCEDURE DATE:  06/10/2021          INTERPRETATION:  CLINICAL INFORMATION: Left lower quadrant abdominal pain. Evaluate for acute diverticulitis.    COMPARISON: 8/10/2020    PROCEDURE:  CT of the Abdomen and Pelvis was performed with intravenous contrast.  Intravenous contrast: 90 ml Omnipaque 350. 10 ml discarded.  Oral contrast: None.  Sagittal and coronal reformats were performed.    FINDINGS:    LOWER CHEST: Cardiomegaly. Interlobular septal thickening, consistent with changes of CHF. Bibasilar atelectasis.    LIVER: Within normal limits.  BILE DUCTS: Normal caliber.  GALLBLADDER: Within normal limits.  SPLEEN: Within normal limits.  PANCREAS: Within normal limits.  ADRENALS: Within normal limits.  KIDNEYS/URETERS: Within normal limits.    BLADDER: Within normal limits.  REPRODUCTIVE ORGANS: Uterus and bilateral adnexa within normal limits.    BOWEL: No bowel obstruction. Appendix is not visualized. Diverticulosis with changes of acute diverticulitis at the level of the mid sigmoid colon.  PERITONEUM: No ascites. No evidence of diverticular abscess. No pneumoperitoneum.  VESSELS:  Atherosclerotic calcifications.  RETROPERITONEUM: No lymphadenopathy.  ABDOMINAL WALL: Within normal limits.  BONES: Degenerative changes and scoliosis. Diffuse osteopenia. Spinal stimulator.    IMPRESSION:  *  Acute diverticulitis involving the mid sigmoid colon. No evidence of diverticular abscess.  *  Cardiomegaly and CHF at the lung bases.                FARRUKH THOMAS MD; Attending Radiologist  This document has been electronically signed. Duran 10 2021  4:43PM    EXAM:  XR CHEST PORTABLE IMMED 1V                            PROCEDURE DATE:  06/10/2021          INTERPRETATION:  AP erect chest on Polina 10, 2021 at 1:38 PM. Patient has sepsis.    Heart magnified by technique. Spinal stimulator from below and sternotomy again noted.    On August 10, 2020 there is a baseline slight interstitial prominence in which is again seen.    The present film shows a vague density right mid lateral lung field likely an infiltrate.    IMPRESSION: Small right mid lateral lung infiltrate. Other findings stable as above.            OUMAR BRADLEY MD; Attending Radiologist  This document has been electronically signed. Duran 10 2021  1:45PM     EXAM:  ECHO TTE WO CON COMP W DOPPLR         PROCEDURE DATE:  2019        INTERPRETATION:  INDICATION: Dyspnea    Blood Pressure 100/67    Height 159 cm     Weight 63 kg       BSA 1.65 sq   m    Dimensions:    LA 3.7       Normal Values: 2.0 - 4.0 cm    Ao 3.3        Normal Values: 2.0 - 3.8 cm  SEPTUM 1.3       Normal Values: 0.6 - 1.2 cm  PWT 1.3       Normal Values: 0.6 - 1.1 cm  LVIDd 4.2         Normal Values: 3.0 - 5.6 cm  LVIDs 3.2         Normal Values: 1.8 - 4.0 cm      OBSERVATIONS:    Technically difficult study  Mitral Valve: Mitral annular calcification is present with thickened   mitral valve leaflets, moderate mitral regurgitation   Aortic Valve/Aorta: Well-seated aortic valve bioprosthesis with mildly   elevated gradients. Peak aortic valve gradient is 17.8 mmHg with a mean   aortic gradient of 9.5 mmHg.  Tricuspid Valve: normal with trace TR.  Pulmonic Valve: Not well visualized  Left Atrium: Mildly dilated  Right Atrium: Not well visualized  Left Ventricle: endocardium not well visualized. Ejection fraction   appears to be about 40%. Cannot comment on segmental LV dysfunction   although there appears to be apical hypokinesis. Mild left ventricular   hypertrophy is present  Right Ventricle: Not well visualized but systolic function appears mildly   decreased  Pericardium/Pleura: normal, no significant pericardial effusion.  Pulmonary/RV Pressure: estimated PA systolic pressure of 28.3 mmHg   LV diastolic dysfunction is present    Conclusion:  Technically difficult study  Left ventricular endocardium not well visualized. Ejection fraction   appears to be about 40%. Cannot comment on segmental LV dysfunction   although there appears to be apical hypokinesis. Mild left ventricular   hypertrophy is present  Well-seated aortic valve bioprosthesis with mildly elevated gradients.   Peak aortic valve gradient is 17.8 mmHg with a mean aortic gradient of   9.5 mmHg.  Mitral annular calcification is present with thickened mitral valve   leaflets, moderate mitral regurgitation is present                  JOSÉ MANUEL DE LOS SANTOS   This document has been electronically signed. 2019  8:18AM

## 2021-06-22 ENCOUNTER — TRANSCRIPTION ENCOUNTER (OUTPATIENT)
Age: 86
End: 2021-06-22

## 2021-06-23 ENCOUNTER — OUTPATIENT (OUTPATIENT)
Dept: OUTPATIENT SERVICES | Facility: HOSPITAL | Age: 86
LOS: 1 days | End: 2021-06-23
Payer: MEDICARE

## 2021-06-23 ENCOUNTER — APPOINTMENT (OUTPATIENT)
Dept: OPHTHALMOLOGY | Facility: HOSPITAL | Age: 86
End: 2021-06-23
Payer: MEDICARE

## 2021-06-23 VITALS
SYSTOLIC BLOOD PRESSURE: 144 MMHG | OXYGEN SATURATION: 96 % | WEIGHT: 254.85 LBS | DIASTOLIC BLOOD PRESSURE: 70 MMHG | HEIGHT: 63.5 IN | TEMPERATURE: 98 F | RESPIRATION RATE: 22 BRPM | HEART RATE: 79 BPM

## 2021-06-23 VITALS
OXYGEN SATURATION: 96 % | RESPIRATION RATE: 19 BRPM | SYSTOLIC BLOOD PRESSURE: 143 MMHG | DIASTOLIC BLOOD PRESSURE: 74 MMHG | HEART RATE: 77 BPM

## 2021-06-23 DIAGNOSIS — Z98.890 OTHER SPECIFIED POSTPROCEDURAL STATES: Chronic | ICD-10-CM

## 2021-06-23 DIAGNOSIS — H25.812 COMBINED FORMS OF AGE-RELATED CATARACT, LEFT EYE: ICD-10-CM

## 2021-06-23 PROCEDURE — 66984 XCAPSL CTRC RMVL W/O ECP: CPT | Mod: LT

## 2021-06-23 PROCEDURE — V2632: CPT

## 2021-06-23 NOTE — ASU PREOP CHECKLIST - VERIFY SURGICAL SITE/SIDE WITH PATIENT
Cardiopulmonary Rehab Activity Record    Comments: In collaboration with ICU RN Pradeep.  Would like to hold activity/ambulation until NP assessment.  Per RN, Patient SPO2 sitting in chair 87% decreased with bed to chair down to 83%.  Will continue to follow as appropriate. RN aware.      done

## 2021-06-23 NOTE — ASU DISCHARGE PLAN (ADULT/PEDIATRIC) - PATIENT EDUCATION MATERIALS PROVIED
Intraocular lens implant (IOL) Eye shield with instructions , sunglasses and eye kit given to patient./Implant card (specify)/Other (specify)

## 2021-06-23 NOTE — ASU DISCHARGE PLAN (ADULT/PEDIATRIC) - CARE PROVIDER_API CALL
no
Shaheed Valenzuela (MD)  Ophthalmology  [address] [place] [address]  Phone: [phone]  Fax: [phone]  Follow Up Time:

## 2021-06-24 ENCOUNTER — NON-APPOINTMENT (OUTPATIENT)
Age: 86
End: 2021-06-24

## 2021-06-24 ENCOUNTER — APPOINTMENT (OUTPATIENT)
Dept: OPHTHALMOLOGY | Facility: CLINIC | Age: 86
End: 2021-06-24
Payer: MEDICARE

## 2021-06-24 PROCEDURE — 99024 POSTOP FOLLOW-UP VISIT: CPT

## 2021-07-06 ENCOUNTER — APPOINTMENT (OUTPATIENT)
Dept: OPHTHALMOLOGY | Facility: CLINIC | Age: 86
End: 2021-07-06
Payer: MEDICARE

## 2021-07-06 ENCOUNTER — NON-APPOINTMENT (OUTPATIENT)
Age: 86
End: 2021-07-06

## 2021-07-06 PROCEDURE — 99024 POSTOP FOLLOW-UP VISIT: CPT

## 2021-07-26 ENCOUNTER — NON-APPOINTMENT (OUTPATIENT)
Age: 86
End: 2021-07-26

## 2021-07-26 ENCOUNTER — APPOINTMENT (OUTPATIENT)
Dept: OPHTHALMOLOGY | Facility: CLINIC | Age: 86
End: 2021-07-26
Payer: MEDICARE

## 2021-07-26 PROCEDURE — 99024 POSTOP FOLLOW-UP VISIT: CPT

## 2021-08-27 ENCOUNTER — APPOINTMENT (OUTPATIENT)
Dept: OPHTHALMOLOGY | Facility: CLINIC | Age: 86
End: 2021-08-27

## 2021-09-24 ENCOUNTER — APPOINTMENT (OUTPATIENT)
Dept: OPHTHALMOLOGY | Facility: CLINIC | Age: 86
End: 2021-09-24
Payer: MEDICARE

## 2021-09-24 ENCOUNTER — NON-APPOINTMENT (OUTPATIENT)
Age: 86
End: 2021-09-24

## 2021-09-24 PROCEDURE — 92012 INTRM OPH EXAM EST PATIENT: CPT

## 2022-02-07 NOTE — ED ADULT NURSE NOTE - NSFALLRSKASSESASSIST_ED_ALL_ED
"Car Coyle is a 66 y.o. male here for a non-provider visit for:   PNEUMOVAX (PPSV23)    Reason for immunization: Overdue/Provider Recommended  Immunization records indicate need for vaccine: Yes, confirmed with Epic  Minimum interval has been met for this vaccine: Yes  ABN completed: Yes    VIS Dated  8/26/2021 was given to patient: Yes  All IAC Questionnaire questions were answered \"No.\"    Patient tolerated injection and no adverse effects were observed or reported: Yes    Pt scheduled for next dose in series: Not Indicated      "
no

## 2022-07-19 PROBLEM — H40.003 GLAUCOMA SUSPECT OF BOTH EYES: Status: ACTIVE | Noted: 2018-06-28

## 2022-10-29 ENCOUNTER — NON-APPOINTMENT (OUTPATIENT)
Age: 87
End: 2022-10-29
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
## 2023-07-16 ENCOUNTER — INPATIENT (INPATIENT)
Facility: HOSPITAL | Age: 88
LOS: 2 days | Discharge: HOME | DRG: 377 | End: 2023-07-19
Attending: HOSPITALIST | Admitting: FAMILY MEDICINE
Payer: MEDICARE

## 2023-07-16 VITALS — WEIGHT: 250 LBS | HEIGHT: 63 IN

## 2023-07-16 DIAGNOSIS — K92.2 GASTROINTESTINAL HEMORRHAGE, UNSPECIFIED: ICD-10-CM

## 2023-07-16 DIAGNOSIS — Z98.890 OTHER SPECIFIED POSTPROCEDURAL STATES: Chronic | ICD-10-CM

## 2023-07-16 LAB
ALBUMIN SERPL ELPH-MCNC: 2.8 G/DL — LOW (ref 3.3–5)
ALP SERPL-CCNC: 65 U/L — SIGNIFICANT CHANGE UP (ref 40–120)
ALT FLD-CCNC: 15 U/L — SIGNIFICANT CHANGE UP (ref 12–78)
ANION GAP SERPL CALC-SCNC: 5 MMOL/L — SIGNIFICANT CHANGE UP (ref 5–17)
APPEARANCE UR: CLEAR — SIGNIFICANT CHANGE UP
APTT BLD: 33.2 SEC — SIGNIFICANT CHANGE UP (ref 27.5–35.5)
AST SERPL-CCNC: 12 U/L — LOW (ref 15–37)
BACTERIA # UR AUTO: ABNORMAL
BASOPHILS # BLD AUTO: 0.03 K/UL — SIGNIFICANT CHANGE UP (ref 0–0.2)
BASOPHILS NFR BLD AUTO: 0.4 % — SIGNIFICANT CHANGE UP (ref 0–2)
BILIRUB SERPL-MCNC: 0.2 MG/DL — SIGNIFICANT CHANGE UP (ref 0.2–1.2)
BILIRUB UR-MCNC: NEGATIVE — SIGNIFICANT CHANGE UP
BLD GP AB SCN SERPL QL: SIGNIFICANT CHANGE UP
BUN SERPL-MCNC: 24 MG/DL — HIGH (ref 7–23)
CALCIUM SERPL-MCNC: 8.1 MG/DL — LOW (ref 8.5–10.1)
CHLORIDE SERPL-SCNC: 110 MMOL/L — HIGH (ref 96–108)
CO2 SERPL-SCNC: 27 MMOL/L — SIGNIFICANT CHANGE UP (ref 22–31)
COLOR SPEC: YELLOW — SIGNIFICANT CHANGE UP
CREAT SERPL-MCNC: 1.15 MG/DL — SIGNIFICANT CHANGE UP (ref 0.5–1.3)
DIFF PNL FLD: NEGATIVE — SIGNIFICANT CHANGE UP
EGFR: 62 ML/MIN/1.73M2 — SIGNIFICANT CHANGE UP
EOSINOPHIL # BLD AUTO: 0.05 K/UL — SIGNIFICANT CHANGE UP (ref 0–0.5)
EOSINOPHIL NFR BLD AUTO: 0.7 % — SIGNIFICANT CHANGE UP (ref 0–6)
GLUCOSE SERPL-MCNC: 166 MG/DL — HIGH (ref 70–99)
GLUCOSE UR QL: NEGATIVE — SIGNIFICANT CHANGE UP
HCT VFR BLD CALC: 24.4 % — LOW (ref 39–50)
HCT VFR BLD CALC: 26.2 % — LOW (ref 39–50)
HGB BLD-MCNC: 8 G/DL — LOW (ref 13–17)
HGB BLD-MCNC: 8.7 G/DL — LOW (ref 13–17)
IMM GRANULOCYTES NFR BLD AUTO: 0.3 % — SIGNIFICANT CHANGE UP (ref 0–0.9)
INR BLD: 1.18 RATIO — HIGH (ref 0.88–1.16)
KETONES UR-MCNC: ABNORMAL
LEUKOCYTE ESTERASE UR-ACNC: ABNORMAL
LYMPHOCYTES # BLD AUTO: 1.16 K/UL — SIGNIFICANT CHANGE UP (ref 1–3.3)
LYMPHOCYTES # BLD AUTO: 15.5 % — SIGNIFICANT CHANGE UP (ref 13–44)
MCHC RBC-ENTMCNC: 30.5 PG — SIGNIFICANT CHANGE UP (ref 27–34)
MCHC RBC-ENTMCNC: 30.9 PG — SIGNIFICANT CHANGE UP (ref 27–34)
MCHC RBC-ENTMCNC: 32.8 GM/DL — SIGNIFICANT CHANGE UP (ref 32–36)
MCHC RBC-ENTMCNC: 33.2 GM/DL — SIGNIFICANT CHANGE UP (ref 32–36)
MCV RBC AUTO: 92.9 FL — SIGNIFICANT CHANGE UP (ref 80–100)
MCV RBC AUTO: 93.1 FL — SIGNIFICANT CHANGE UP (ref 80–100)
MONOCYTES # BLD AUTO: 0.52 K/UL — SIGNIFICANT CHANGE UP (ref 0–0.9)
MONOCYTES NFR BLD AUTO: 7 % — SIGNIFICANT CHANGE UP (ref 2–14)
NEUTROPHILS # BLD AUTO: 5.69 K/UL — SIGNIFICANT CHANGE UP (ref 1.8–7.4)
NEUTROPHILS NFR BLD AUTO: 76.1 % — SIGNIFICANT CHANGE UP (ref 43–77)
NITRITE UR-MCNC: NEGATIVE — SIGNIFICANT CHANGE UP
NT-PROBNP SERPL-SCNC: 333 PG/ML — SIGNIFICANT CHANGE UP (ref 0–450)
PH UR: 5 — SIGNIFICANT CHANGE UP (ref 5–8)
PLATELET # BLD AUTO: 154 K/UL — SIGNIFICANT CHANGE UP (ref 150–400)
PLATELET # BLD AUTO: 161 K/UL — SIGNIFICANT CHANGE UP (ref 150–400)
POTASSIUM SERPL-MCNC: 4.1 MMOL/L — SIGNIFICANT CHANGE UP (ref 3.5–5.3)
POTASSIUM SERPL-SCNC: 4.1 MMOL/L — SIGNIFICANT CHANGE UP (ref 3.5–5.3)
PROT SERPL-MCNC: 5.8 GM/DL — LOW (ref 6–8.3)
PROT UR-MCNC: NEGATIVE — SIGNIFICANT CHANGE UP
PROTHROM AB SERPL-ACNC: 13.7 SEC — HIGH (ref 10.5–13.4)
RBC # BLD: 2.62 M/UL — LOW (ref 4.2–5.8)
RBC # BLD: 2.82 M/UL — LOW (ref 4.2–5.8)
RBC # FLD: 13.2 % — SIGNIFICANT CHANGE UP (ref 10.3–14.5)
RBC # FLD: 13.3 % — SIGNIFICANT CHANGE UP (ref 10.3–14.5)
RBC CASTS # UR COMP ASSIST: SIGNIFICANT CHANGE UP /HPF (ref 0–4)
SODIUM SERPL-SCNC: 142 MMOL/L — SIGNIFICANT CHANGE UP (ref 135–145)
SP GR SPEC: 1.02 — SIGNIFICANT CHANGE UP (ref 1.01–1.02)
TROPONIN I, HIGH SENSITIVITY RESULT: 356.08 NG/L — HIGH
TROPONIN I, HIGH SENSITIVITY RESULT: 418.16 NG/L — HIGH
TROPONIN I, HIGH SENSITIVITY RESULT: 527.98 NG/L — HIGH
UROBILINOGEN FLD QL: 1
WBC # BLD: 6.52 K/UL — SIGNIFICANT CHANGE UP (ref 3.8–10.5)
WBC # BLD: 7.47 K/UL — SIGNIFICANT CHANGE UP (ref 3.8–10.5)
WBC # FLD AUTO: 6.52 K/UL — SIGNIFICANT CHANGE UP (ref 3.8–10.5)
WBC # FLD AUTO: 7.47 K/UL — SIGNIFICANT CHANGE UP (ref 3.8–10.5)
WBC UR QL: SIGNIFICANT CHANGE UP /HPF (ref 0–5)

## 2023-07-16 PROCEDURE — 84484 ASSAY OF TROPONIN QUANT: CPT

## 2023-07-16 PROCEDURE — C9113: CPT

## 2023-07-16 PROCEDURE — 85027 COMPLETE CBC AUTOMATED: CPT

## 2023-07-16 PROCEDURE — 99291 CRITICAL CARE FIRST HOUR: CPT

## 2023-07-16 PROCEDURE — 71045 X-RAY EXAM CHEST 1 VIEW: CPT | Mod: 26

## 2023-07-16 PROCEDURE — P9016: CPT

## 2023-07-16 PROCEDURE — 94640 AIRWAY INHALATION TREATMENT: CPT

## 2023-07-16 PROCEDURE — 36415 COLL VENOUS BLD VENIPUNCTURE: CPT

## 2023-07-16 PROCEDURE — 83880 ASSAY OF NATRIURETIC PEPTIDE: CPT

## 2023-07-16 PROCEDURE — 80048 BASIC METABOLIC PNL TOTAL CA: CPT

## 2023-07-16 PROCEDURE — 86923 COMPATIBILITY TEST ELECTRIC: CPT

## 2023-07-16 PROCEDURE — 97163 PT EVAL HIGH COMPLEX 45 MIN: CPT | Mod: GP

## 2023-07-16 PROCEDURE — 83036 HEMOGLOBIN GLYCOSYLATED A1C: CPT

## 2023-07-16 PROCEDURE — 71045 X-RAY EXAM CHEST 1 VIEW: CPT

## 2023-07-16 PROCEDURE — 36430 TRANSFUSION BLD/BLD COMPNT: CPT

## 2023-07-16 PROCEDURE — 93010 ELECTROCARDIOGRAM REPORT: CPT

## 2023-07-16 PROCEDURE — 74177 CT ABD & PELVIS W/CONTRAST: CPT | Mod: 26,MA

## 2023-07-16 PROCEDURE — 80061 LIPID PANEL: CPT

## 2023-07-16 PROCEDURE — 85610 PROTHROMBIN TIME: CPT

## 2023-07-16 PROCEDURE — 97116 GAIT TRAINING THERAPY: CPT | Mod: GP

## 2023-07-16 PROCEDURE — 99223 1ST HOSP IP/OBS HIGH 75: CPT

## 2023-07-16 PROCEDURE — 93306 TTE W/DOPPLER COMPLETE: CPT

## 2023-07-16 PROCEDURE — 97530 THERAPEUTIC ACTIVITIES: CPT | Mod: GP

## 2023-07-16 RX ORDER — LISINOPRIL 2.5 MG/1
2.5 TABLET ORAL DAILY
Refills: 0 | Status: DISCONTINUED | OUTPATIENT
Start: 2023-07-16 | End: 2023-07-19

## 2023-07-16 RX ORDER — METOPROLOL TARTRATE 50 MG
25 TABLET ORAL
Refills: 0 | Status: DISCONTINUED | OUTPATIENT
Start: 2023-07-16 | End: 2023-07-19

## 2023-07-16 RX ORDER — TAMSULOSIN HYDROCHLORIDE 0.4 MG/1
1 CAPSULE ORAL
Qty: 0 | Refills: 0 | DISCHARGE

## 2023-07-16 RX ORDER — LANOLIN ALCOHOL/MO/W.PET/CERES
3 CREAM (GRAM) TOPICAL AT BEDTIME
Refills: 0 | Status: DISCONTINUED | OUTPATIENT
Start: 2023-07-16 | End: 2023-07-19

## 2023-07-16 RX ORDER — BUDESONIDE AND FORMOTEROL FUMARATE DIHYDRATE 160; 4.5 UG/1; UG/1
2 AEROSOL RESPIRATORY (INHALATION)
Refills: 0 | Status: DISCONTINUED | OUTPATIENT
Start: 2023-07-16 | End: 2023-07-19

## 2023-07-16 RX ORDER — PANTOPRAZOLE SODIUM 20 MG/1
8 TABLET, DELAYED RELEASE ORAL
Qty: 80 | Refills: 0 | Status: DISCONTINUED | OUTPATIENT
Start: 2023-07-16 | End: 2023-07-17

## 2023-07-16 RX ORDER — PANTOPRAZOLE SODIUM 20 MG/1
80 TABLET, DELAYED RELEASE ORAL ONCE
Refills: 0 | Status: COMPLETED | OUTPATIENT
Start: 2023-07-16 | End: 2023-07-16

## 2023-07-16 RX ORDER — CYCLOBENZAPRINE HYDROCHLORIDE 10 MG/1
1 TABLET, FILM COATED ORAL
Qty: 0 | Refills: 0 | DISCHARGE

## 2023-07-16 RX ORDER — ACETAMINOPHEN 500 MG
650 TABLET ORAL EVERY 6 HOURS
Refills: 0 | Status: DISCONTINUED | OUTPATIENT
Start: 2023-07-16 | End: 2023-07-19

## 2023-07-16 RX ORDER — VERAPAMIL HCL 240 MG
240 CAPSULE, EXTENDED RELEASE PELLETS 24 HR ORAL DAILY
Refills: 0 | Status: DISCONTINUED | OUTPATIENT
Start: 2023-07-16 | End: 2023-07-19

## 2023-07-16 RX ORDER — ATORVASTATIN CALCIUM 80 MG/1
20 TABLET, FILM COATED ORAL AT BEDTIME
Refills: 0 | Status: DISCONTINUED | OUTPATIENT
Start: 2023-07-16 | End: 2023-07-19

## 2023-07-16 RX ADMIN — PANTOPRAZOLE SODIUM 10 MG/HR: 20 TABLET, DELAYED RELEASE ORAL at 16:05

## 2023-07-16 RX ADMIN — PANTOPRAZOLE SODIUM 80 MILLIGRAM(S): 20 TABLET, DELAYED RELEASE ORAL at 14:16

## 2023-07-16 RX ADMIN — ATORVASTATIN CALCIUM 20 MILLIGRAM(S): 80 TABLET, FILM COATED ORAL at 21:43

## 2023-07-16 RX ADMIN — Medication 25 MILLIGRAM(S): at 21:43

## 2023-07-16 NOTE — H&P ADULT - NSHPPHYSICALEXAM_GEN_ALL_CORE
Vital Signs Last 24 Hrs  T(C): 36.7 (16 Jul 2023 17:49), Max: 37.1 (16 Jul 2023 17:27)  T(F): 98.1 (16 Jul 2023 17:49), Max: 98.7 (16 Jul 2023 17:27)  HR: 93 (16 Jul 2023 17:49) (91 - 95)  BP: 129/49 (16 Jul 2023 17:49) (119/49 - 134/64)  BP(mean): 58 (16 Jul 2023 12:31) (58 - 58)  RR: 19 (16 Jul 2023 17:49) (17 - 19)  SpO2: 99% (16 Jul 2023 17:49) (96% - 99%)    Parameters below as of 16 Jul 2023 17:49  Patient On (Oxygen Delivery Method): room air

## 2023-07-16 NOTE — H&P ADULT - HISTORY OF PRESENT ILLNESS
ardiomyopathy and HTN presents to the ED c/o rectal bleeding, "black color,"   +nausea, lethargic and weakness, feels faint with movement.    denies abd pain, or diarrhea.   No blood thinners.  Armenian-speaking and here with her daughter.  +bright red blood per rectum 2 days ago.    Yesterday noticed that the stool was darker and   today2 episodes of darker bowel movement.    Patient feeling more weak and more tired.   no travel, no sick contacts      In ED /49   HR 96   RR 18   T 97  GI consulted  cardiology consulted  protonix gtt  pt refused transfusion        PAST MEDICAL HX  Cardiomyopathy   HTN (hypertension).     PAST SURGICAL HX  History of colonoscopy.   87 year old male w CAD s/p stent, hypertensive cardiomyopathy, elevated blood glucose and HTN presents to the ED c/o rectal bleeding,     "black color,"   +nausea, lethargic and weakness,   feels faint with movement daphne if standing   denies abd pain, or diarrhea.   +bright red blood per rectum 2 days ago.    Yesterday noticed that the stool was darker and   today 2 episodes of darker bowel movement.    Patient feeling more weak and more tired.     No travel, no sick contacts  he stopped his asa on Thursday    wife reported that pt had some heartburn, lower chrst/epigastric discomfort/tightness  did not grade for me   not clear what makes it better   activity makes it worse    Saw Cardiology Wednesday and had echo and ekg  scheduled for stress this week, Wednesday      In ED /49   HR 96   RR 18   T 97  GI consulted  cardiology consulted  protonix gtt  pt refused transfusion earlier        PAST MEDICAL HX  CAD coronary artery disease  Elevated blood glucose  Hypertensive Cardiomyopathy   HTN (hypertension).     PAST SURGICAL HX  Cardiac cath  12/20 with ROCAEL to [facility]  History of colonoscopy.      FAMILY HX  no relevant hx CAD, HTN      SOCIAL HX    nonsmoker

## 2023-07-16 NOTE — PATIENT PROFILE ADULT - FALL HARM RISK - HARM RISK INTERVENTIONS

## 2023-07-16 NOTE — ED ADULT NURSE NOTE - OBJECTIVE STATEMENT
Pt to ED with daughter c/o rectal bleeding which started Friday. Pr reports red blood in the stool, today reports  "black color," . today pt woke up with nausea, lethargy and weakness, feels faint with movement.  denies abd pain, or diarrhea. No blood thinners. PIV obtained, labs sent, awaiting [facility] [place] [place] bedside for rectal exam and xray. Plan of care explained with understanding, daughter bedside for translation as per pt request.

## 2023-07-16 NOTE — ED ADULT NURSE NOTE - NSFALLHARMRISKINTERV_ED_ALL_ED

## 2023-07-16 NOTE — H&P ADULT - NSHPLABSRESULTS_GEN_ALL_CORE
COMPARISON: None.    CONTRAST/COMPLICATIONS:  IV Contrast: Omnipaque 350  90 cc administered   10 cc discarded  Oral Contrast: NONE  Complications: None reported at time of study completion    PROCEDURE:  CT of the Abdomen and Pelvis was performed.  Sagittal and coronal reformats were performed.    FINDINGS:  LOWER CHEST: Within normal limits.    LIVER: Within normal limits.  BILE DUCTS: Normal caliber.  GALLBLADDER: Within normal limits.  SPLEEN: Within normal limits.  PANCREAS: Within normal limits.  ADRENALS: Within normal limits.  KIDNEYS/URETERS: Within normal limits.    BLADDER: Within normal limits.  REPRODUCTIVE ORGANS: Prostate is enlarged.    BOWEL: Diverticulosis without evidence of diverticulitis Appendix is not   visualized. No evidence of inflammation in the pericecal region.  PERITONEUM: No ascites.  VESSELS: Within normal limits.  RETROPERITONEUM/LYMPH NODES: No lymphadenopathy.  ABDOMINAL WALL: Within normal limits.  BONES: Within normal limits.    IMPRESSION:  Diverticulosis without evidence of diverticulitis. No evidence of acute   intra-abdominal pathology.        CXR   INTERPRETATION:  AP chest on July 16, 2023 2:28 PM. Patient has weakness.    Heart magnified by technique.    Slight atelectasis at left base on December 19, 2019 no longer evident.    IMPRESSION: No acute finding at this time.

## 2023-07-16 NOTE — H&P ADULT - ASSESSMENT
87 year old male w CAD s/p stent, hypertensive cardiomyopathy, elevated blood glucose and HTN presents to the ED c/o rectal bleeding,       #GI bleed  no additional blood or melena in ED  #Symptomatic anemia  CT no acute disease  1. admit to telemetry  2. CBC 22:00 and in AM  3. protonix gtt  4. clears po  5. GI consult  6. orthostatic BP  7. if HB <8 will need transfusion  8. O2 overnight      #Elevated troponin  suspect demand ischemia  EKG NSR  no significant change when I compared it to the one he had 06-26  #CAD  #Hypertensive CM  1. serial trop  2. holding asa since gi bleed  3. had recent echo (not resulted on his [facility] livan)  4. statin  5.. cardiology  6. lipid       #HTN  1. home meds w parameters      #Hypoalbuminemia  1. nutrition consult        #MISC  inhaler        #VTE  active bleeding 87 year old male w CAD s/p stent, hypertensive cardiomyopathy, elevated blood glucose and HTN presents to the ED c/o rectal bleeding,       #GI bleed  no additional blood or melena in ED  #Symptomatic anemia  CT no acute disease  1. admit to telemetry  2. CBC 22:00 and in AM  3. protonix gtt  4. clears po  5. GI consult  6. orthostatic BP  7. if HB <8 will need transfusion  had declined transfusion earlier: family felt same and are aware of urgent need if < 8  8. O2 overnight      #Elevated troponin  suspect demand ischemia  EKG NSR  no significant change when I compared it to the one he had 06-26  #CAD  #Hypertensive CM  1. serial trop  2. holding asa since gi bleed  3. had recent echo (not resulted on his [facility])  4. statin  5.. cardiology  6. lipid       #HTN  1. home meds w parameters      #Hypoalbuminemia  1. nutrition consult        #MISC  inhaler        #VTE  active bleeding

## 2023-07-16 NOTE — ED PROVIDER NOTE - PROGRESS NOTE DETAILS
Attending Laila, lot [id number] dark bloody stool, heme +, qc reactive Ankush [place]:: discussed w/ Dr Finley; feels that due to elevated troponin and ischemia, trend troponin, hold on heparin. Attending james [place]/carolyn Henning - clear liquid diet and GI will follow Attending [place], daughter and pt updated on results.  pt had labs on 6/28/23 and hgb = 13 d/w dr newman, on for lluvia, agrees with plan, will consult. MD HI Attending [place], daughter and pt updated on results.  pt had labs on 6/28/23 and hgb = 13    d/w daughter and pt about blood transfusion given drop.  Pt would prefer to hold off on blood transfusion for now.

## 2023-07-16 NOTE — ED ADULT TRIAGE NOTE - CHIEF COMPLAINT QUOTE
pt c/o rectal bleeding, "black color," +nausea, lethargic and weakness, feels faint with movement.  denies abd pain, or diarrhea. No blood thinners.

## 2023-07-16 NOTE — ED PROVIDER NOTE - OBJECTIVE STATEMENT
unknown  HCG----42  Pt notified and will repeat hcg on Monday  Episode created  hcg level ordered  appt scheduled   88 y/o male w/PMHx of cardiomyopathy and HTN presents to the ED c/o rectal bleeding, "black color," +nausea, lethargic and weakness, feels faint with movement.  denies abd pain, or diarrhea. No blood thinners. 86 y/o male w/PMHx of cardiomyopathy and HTN presents to the ED c/o rectal bleeding, "black color," +nausea, lethargic and weakness, feels faint with movement.  denies abd pain, or diarrhea. No blood thinners.  Patient is Polish-speaking and here with her daughter.  Patient noticed bright red blood per rectum 2 days ago.  Yesterday noticed that the stool was darker and today with 2 episodes of darker bowel movement.  Patient feeling more weak and more tired.   no travel, no sick contacts

## 2023-07-16 NOTE — PATIENT PROFILE ADULT - FALL HARM RISK - ATTEMPT OOB
Patient: Smiley Robins    Pre-op Dx:  Shortness of breath/anginal equivalent, abnormal nuclear stress test      Post-op Dx:  Severe multivessel coronary artery disease with normal LVEF    Procedure:  Left heart cardiac catheterization, coronary angiography, left ventriculography    Surgeon:Blake Wilkinson MD, [facility]    Anesthesia Type:  Moderate sedation    Findings:   Left main:  Normal  Left anterior descending artery:  Severe disease in proximal and mid segments  Left circumflex:  Nondominant, moderate 50-60% disease in large 1st OM branch  Right coronary artery:  Dominant vessel 100% occluded proximally, distal RCA fills via collateral  Left ventriculography:  LVEF and wall motion is normal  Hemodynamics   aorta:  112/53 mean 72 mm Hg  LV:  124/9 mean 17 mm Hg  Pullback  /9 mean 18 mm Hg  Aorta:  126/52 mean 78 mm Hg    Summary:  Critical 2 vessel coronary artery disease with normal LVEF and increased LVEDP    Recommendations:  Cardiovascular surgery consult for CABG    Estimated Blood Loss:  Less than 10 mL    Complications:  None        Antiplatelet recommendations to CV Surgeon    Does the patient have ACS? Yes: Is patient being recommended for CABG (coronary artery bypass graft)? Yes Continue aspirin pre-op.  D/C P2Y12 inhibitors (prasugrel, clopidogrel, ticagrelor) per ACC guidelines pre-op.     No

## 2023-07-17 LAB
A1C WITH ESTIMATED AVERAGE GLUCOSE RESULT: 6.2 % — HIGH (ref 4–5.6)
ANION GAP SERPL CALC-SCNC: 7 MMOL/L — SIGNIFICANT CHANGE UP (ref 5–17)
BUN SERPL-MCNC: 18 MG/DL — SIGNIFICANT CHANGE UP (ref 7–23)
CALCIUM SERPL-MCNC: 8 MG/DL — LOW (ref 8.5–10.1)
CHLORIDE SERPL-SCNC: 110 MMOL/L — HIGH (ref 96–108)
CHOLEST SERPL-MCNC: 81 MG/DL — SIGNIFICANT CHANGE UP
CO2 SERPL-SCNC: 24 MMOL/L — SIGNIFICANT CHANGE UP (ref 22–31)
CREAT SERPL-MCNC: 1.14 MG/DL — SIGNIFICANT CHANGE UP (ref 0.5–1.3)
CULTURE RESULTS: SIGNIFICANT CHANGE UP
EGFR: 63 ML/MIN/1.73M2 — SIGNIFICANT CHANGE UP
ESTIMATED AVERAGE GLUCOSE: 131 MG/DL — HIGH (ref 68–114)
GLUCOSE SERPL-MCNC: 206 MG/DL — HIGH (ref 70–99)
HCT VFR BLD CALC: 23 % — LOW (ref 39–50)
HCT VFR BLD CALC: 24.6 % — LOW (ref 39–50)
HDLC SERPL-MCNC: 36 MG/DL — LOW
HGB BLD-MCNC: 7.5 G/DL — LOW (ref 13–17)
HGB BLD-MCNC: 8.1 G/DL — LOW (ref 13–17)
INR BLD: 1.18 RATIO — HIGH (ref 0.88–1.16)
LIPID PNL WITH DIRECT LDL SERPL: 31 MG/DL — SIGNIFICANT CHANGE UP
MCHC RBC-ENTMCNC: 30.5 PG — SIGNIFICANT CHANGE UP (ref 27–34)
MCHC RBC-ENTMCNC: 30.8 PG — SIGNIFICANT CHANGE UP (ref 27–34)
MCHC RBC-ENTMCNC: 32.6 GM/DL — SIGNIFICANT CHANGE UP (ref 32–36)
MCHC RBC-ENTMCNC: 32.9 GM/DL — SIGNIFICANT CHANGE UP (ref 32–36)
MCV RBC AUTO: 93.5 FL — SIGNIFICANT CHANGE UP (ref 80–100)
MCV RBC AUTO: 93.5 FL — SIGNIFICANT CHANGE UP (ref 80–100)
NON HDL CHOLESTEROL: 46 MG/DL — SIGNIFICANT CHANGE UP
PLATELET # BLD AUTO: 157 K/UL — SIGNIFICANT CHANGE UP (ref 150–400)
PLATELET # BLD AUTO: 171 K/UL — SIGNIFICANT CHANGE UP (ref 150–400)
POTASSIUM SERPL-MCNC: 3.4 MMOL/L — LOW (ref 3.5–5.3)
POTASSIUM SERPL-SCNC: 3.4 MMOL/L — LOW (ref 3.5–5.3)
PROTHROM AB SERPL-ACNC: 13.7 SEC — HIGH (ref 10.5–13.4)
RBC # BLD: 2.46 M/UL — LOW (ref 4.2–5.8)
RBC # BLD: 2.63 M/UL — LOW (ref 4.2–5.8)
RBC # FLD: 13.3 % — SIGNIFICANT CHANGE UP (ref 10.3–14.5)
RBC # FLD: 13.4 % — SIGNIFICANT CHANGE UP (ref 10.3–14.5)
SODIUM SERPL-SCNC: 141 MMOL/L — SIGNIFICANT CHANGE UP (ref 135–145)
SPECIMEN SOURCE: SIGNIFICANT CHANGE UP
TRIGL SERPL-MCNC: 66 MG/DL — SIGNIFICANT CHANGE UP
TROPONIN I, HIGH SENSITIVITY RESULT: 618.05 NG/L — HIGH
TROPONIN I, HIGH SENSITIVITY RESULT: 642.75 NG/L — HIGH
WBC # BLD: 7.57 K/UL — SIGNIFICANT CHANGE UP (ref 3.8–10.5)
WBC # BLD: 7.98 K/UL — SIGNIFICANT CHANGE UP (ref 3.8–10.5)
WBC # FLD AUTO: 7.57 K/UL — SIGNIFICANT CHANGE UP (ref 3.8–10.5)
WBC # FLD AUTO: 7.98 K/UL — SIGNIFICANT CHANGE UP (ref 3.8–10.5)

## 2023-07-17 PROCEDURE — 99233 SBSQ HOSP IP/OBS HIGH 50: CPT

## 2023-07-17 PROCEDURE — 99222 1ST HOSP IP/OBS MODERATE 55: CPT

## 2023-07-17 RX ORDER — SODIUM CHLORIDE 9 MG/ML
1000 INJECTION INTRAMUSCULAR; INTRAVENOUS; SUBCUTANEOUS ONCE
Refills: 0 | Status: COMPLETED | OUTPATIENT
Start: 2023-07-17 | End: 2023-07-17

## 2023-07-17 RX ORDER — POTASSIUM CHLORIDE 20 MEQ
40 PACKET (EA) ORAL ONCE
Refills: 0 | Status: COMPLETED | OUTPATIENT
Start: 2023-07-17 | End: 2023-07-17

## 2023-07-17 RX ORDER — ASPIRIN/CALCIUM CARB/MAGNESIUM 324 MG
81 TABLET ORAL DAILY
Refills: 0 | Status: DISCONTINUED | OUTPATIENT
Start: 2023-07-17 | End: 2023-07-19

## 2023-07-17 RX ADMIN — Medication 25 MILLIGRAM(S): at 10:44

## 2023-07-17 RX ADMIN — LISINOPRIL 2.5 MILLIGRAM(S): 2.5 TABLET ORAL at 10:44

## 2023-07-17 RX ADMIN — ATORVASTATIN CALCIUM 20 MILLIGRAM(S): 80 TABLET, FILM COATED ORAL at 21:32

## 2023-07-17 RX ADMIN — BUDESONIDE AND FORMOTEROL FUMARATE DIHYDRATE 2 PUFF(S): 160; 4.5 AEROSOL RESPIRATORY (INHALATION) at 08:11

## 2023-07-17 RX ADMIN — SODIUM CHLORIDE 1000 MILLILITER(S): 9 INJECTION INTRAMUSCULAR; INTRAVENOUS; SUBCUTANEOUS at 17:38

## 2023-07-17 RX ADMIN — Medication 40 MILLIEQUIVALENT(S): at 14:12

## 2023-07-17 RX ADMIN — Medication 3 MILLIGRAM(S): at 21:32

## 2023-07-17 RX ADMIN — PANTOPRAZOLE SODIUM 10 MG/HR: 20 TABLET, DELAYED RELEASE ORAL at 01:22

## 2023-07-17 RX ADMIN — Medication 240 MILLIGRAM(S): at 10:44

## 2023-07-17 RX ADMIN — BUDESONIDE AND FORMOTEROL FUMARATE DIHYDRATE 2 PUFF(S): 160; 4.5 AEROSOL RESPIRATORY (INHALATION) at 20:04

## 2023-07-17 RX ADMIN — Medication 81 MILLIGRAM(S): at 14:12

## 2023-07-17 NOTE — PROGRESS NOTE ADULT - ASSESSMENT
87 year old male w CAD s/p stent, hypertensive cardiomyopathy, elevated blood glucose and HTN presents to the ED c/o rectal bleeding,     #Acute blood loss anemia, sx'atic GIB, likely LGIB 2/2 diverticular bleed (has hx of same)  -check orthostatics   -trend Hgb, transfuse if <7  -does not need PPI  -appreciate GI input, no plan for scope as would likely only be dx  -likely self limited  -advanced diet    #Elevated trop, hx CAD- likely demand ischemia in setting of above  -now downtrended  -had recent outpt echo showing nl EF, LVH, mod AS, mild MS  -likely plan for cath as outpt  -ASA (ok to resume per GI), statin, BB, ACEi  -appreciate Dr. Grover input    #DVT ppx- SCDs    Possible d/c 7/18 if Hgb stable 87 year old male w CAD s/p stent, hypertensive cardiomyopathy, elevated blood glucose and HTN presents to the ED c/o rectal bleeding,     #Acute blood loss anemia, sx'atic GIB, likely LGIB 2/2 diverticular bleed (has hx of same)  -check orthostatics   -trend Hgb, transfuse if <7  -does not need PPI  -appreciate GI input, no plan for scope as would likely only be dx  -likely self limited  -advanced diet    #Elevated trop, hx CAD- likely demand ischemia in setting of above  -now downtrended  -had recent outpt echo showing nl EF, LVH, mod AS, mild MS  -likely plan for cath as outpt  -ASA (ok to resume per GI), statin, BB, ACEi  -appreciate Dr. Grover input    #DVT ppx- SCDs    Possible d/c 7/18 if Hgb stable. Outpt cath.

## 2023-07-17 NOTE — CONSULT NOTE ADULT - SUBJECTIVE AND OBJECTIVE BOX
Patient is a 87y old  Male who presents with a chief complaint of GI bleed  elevated troponin (16 Jul 2023 18:05)    87 year old man with CAD s/p stent last year, HTN, prior diverticular bleed, here for hematochezia.     Patient states that on Friday had two episodes of large volume hematochezia. Bleeding was painless. No hematemesis or melena. Stool turned darker over the weekend and he felt weak so decided to come in. No chest pain or shortness of breath. Does feel dizzy walking around but no syncope. No recent change in BM's. Last time he had any overt bleedign was 10 years ago, attributed to diverticulosis, that was his last colonoscopy. No travel or sick contacts. No eating raw or undercooked meat. Has not had any overt bleeding since admitted.       PAST MEDICAL & SURGICAL HISTORY:  Cardiomyopathy      HTN (hypertension)      History of colonoscopy    catheterization    MEDICATIONS  (STANDING):  atorvastatin 20 milliGRAM(s) Oral at bedtime  budesonide 160 MICROgram(s)/formoterol 4.5 MICROgram(s) Inhaler 2 Puff(s) Inhalation two times a day  lisinopril 2.5 milliGRAM(s) Oral daily  melatonin 3 milliGRAM(s) Oral at bedtime  metoprolol tartrate 25 milliGRAM(s) Oral two times a day  pantoprazole Infusion 8 mG/Hr (10 mL/Hr) IV Continuous <Continuous>  verapamil  milliGRAM(s) Oral daily    MEDICATIONS  (PRN):  acetaminophen     Tablet .. 650 milliGRAM(s) Oral every 6 hours PRN Temp greater or equal to 38C (100.4F), Mild Pain (1 - 3)  aluminum hydroxide/magnesium hydroxide/simethicone Suspension 30 milliLiter(s) Oral every 6 hours PRN Dyspepsia      Allergies    No Known Allergies    Intolerances    SOCIAL HISTORY:  no smoking, drinking or drugs    FAMILY HISTORY:  no colon or stomach cancer    REVIEW OF SYSTEMS:    CONSTITUTIONAL: + weakness, no fevers or chills  EYES/ENT: No visual changes;  No vertigo or throat pain   NECK: No pain or stiffness  RESPIRATORY: No cough, wheezing, hemoptysis; No shortness of breath  CARDIOVASCULAR: No chest pain or palpitations  GASTROINTESTINAL: No abdominal or epigastric pain. No nausea, vomiting, or hematemesis; No diarrhea or constipation. No melena or hematochezia.  GENITOURINARY: No dysuria, frequency or hematuria  NEUROLOGICAL: No numbness or weakness  SKIN: No itching, burning, rashes, or lesions   PSYCH: Normal mood and affect  All other review of systems is negative unless indicated above.    Vital Signs Last 24 Hrs  T(C): 36.7 (16 Jul 2023 20:10), Max: 37.1 (16 Jul 2023 17:27)  T(F): 98 (16 Jul 2023 20:10), Max: 98.7 (16 Jul 2023 17:27)  HR: 94 (16 Jul 2023 21:40) (91 - 96)  BP: 119/53 (16 Jul 2023 21:40) (119/49 - 134/64)  BP(mean): 58 (16 Jul 2023 12:31) (58 - 58)  RR: 18 (16 Jul 2023 20:10) (17 - 19)  SpO2: 99% (16 Jul 2023 20:10) (96% - 99%)    Parameters below as of 16 Jul 2023 20:10  Patient On (Oxygen Delivery Method): room air        PHYSICAL EXAM:    Constitutional: No acute distress, well-developed, non-toxic appearing, obese  HEENT: masked, good phonation, not icteric  Neck: supple, no lymphadenopathy  Respiratory: clear to ascultation bilaterally, no wheezing  Cardiovascular: S1 and S2, regular rate and rhythm, no murmurs rubs or gallops  Gastrointestinal: soft, non-tender, non-distended, +bowel sounds, no rebound or guarding,   Extremities: No peripheral edema, no cyanosis or clubbing  Vascular: 2+ peripheral pulses, no venous stasis  Neurological: A/O x 3, no focal deficits, no asterixis  Psychiatric: Normal mood, normal affect  Skin: No rashes, not jaundiced    LABS:                        7.5    7.57  )-----------( 157      ( 17 Jul 2023 06:25 )             23.0     07-16    142  |  110<H>  |  24<H>  ----------------------------<  166<H>  4.1   |  27  |  1.15    Ca    8.1<L>      16 Jul 2023 13:02    TPro  5.8<L>  /  Alb  2.8<L>  /  TBili  0.2  /  DBili  x   /  AST  12<L>  /  ALT  15  /  AlkPhos  65  07-16    PT/INR - ( 17 Jul 2023 06:25 )   PT: 13.7 sec;   INR: 1.18 ratio         PTT - ( 16 Jul 2023 13:02 )  PTT:33.2 sec  LIVER FUNCTIONS - ( 16 Jul 2023 13:02 )  Alb: 2.8 g/dL / Pro: 5.8 gm/dL / ALK PHOS: 65 U/L / ALT: 15 U/L / AST: 12 U/L / GGT: x             RADIOLOGY & ADDITIONAL STUDIES: CT with diverticulosis

## 2023-07-17 NOTE — DIETITIAN INITIAL EVALUATION ADULT - MALNUTRITION
Pt meets criteria for severe protein-calorie malnutrition in context of acute  Pt meets criteria for severe protein-calorie malnutrition in context of acute condition

## 2023-07-17 NOTE — CONSULT NOTE ADULT - SUBJECTIVE AND OBJECTIVE BOX
Patient is a 87y old  Male who presents with a chief complaint of Gastrointestinal hemorrhage     (17 Jul 2023 08:17)      HPI:  87 year old male w CAD s/p stent, hypertensive cardiomyopathy, elevated blood glucose and HTN presents to the ED c/o rectal bleeding,     "black color,"   +nausea, lethargic and weakness,   feels faint with movement daphne if standing   denies abd pain, or diarrhea.   +bright red blood per rectum 2 days ago.    Yesterday noticed that the stool was darker and   today 2 episodes of darker bowel movement.    Patient feeling more weak and more tired.     No travel, no sick contacts  he stopped his asa on Thursday    wife reported that pt had some heartburn, lower chrst/epigastric discomfort/tightness  did not grade for me   not clear what makes it better   activity makes it worse    Saw Cardiology Wednesday and had echo and ekg  scheduled for stress this week, Wednesday      In ED /49   HR 96   RR 18   T 97  GI consulted  cardiology consulted  protonix gtt  pt refused transfusion earlier        PAST MEDICAL HX  CAD coronary artery disease  Elevated blood glucose  Hypertensive Cardiomyopathy   HTN (hypertension).     PAST SURGICAL HX  Cardiac cath  12/20 with ROCAEL to PRDA  History of colonoscopy.      FAMILY HX  no relevant hx CAD, HTN      SOCIAL HX    nonsmoker   (16 Jul 2023 18:05)      PAST MEDICAL & SURGICAL HISTORY:  Cardiomyopathy      HTN (hypertension)      History of colonoscopy          PREVIOUS CARDIAC WORKUP:      Echo:  Stress Test:  Cardiac Cath:    ALLERGIES:    No Known Allergies       MEDICATIONS  (STANDING):  atorvastatin 20 milliGRAM(s) Oral at bedtime  budesonide 160 MICROgram(s)/formoterol 4.5 MICROgram(s) Inhaler 2 Puff(s) Inhalation two times a day  lisinopril 2.5 milliGRAM(s) Oral daily  melatonin 3 milliGRAM(s) Oral at bedtime  metoprolol tartrate 25 milliGRAM(s) Oral two times a day  potassium chloride    Tablet ER 40 milliEquivalent(s) Oral once  verapamil  milliGRAM(s) Oral daily    MEDICATIONS  (PRN):  acetaminophen     Tablet .. 650 milliGRAM(s) Oral every 6 hours PRN Temp greater or equal to 38C (100.4F), Mild Pain (1 - 3)  aluminum hydroxide/magnesium hydroxide/simethicone Suspension 30 milliLiter(s) Oral every 6 hours PRN Dyspepsia      FAMILY HISTORY:        SOCIAL HISTORY:  .scl    ROS:     .ros    Vital Signs Last 24 Hrs  T(C): 36.8 (17 Jul 2023 09:26), Max: 37.1 (16 Jul 2023 17:27)  T(F): 98.3 (17 Jul 2023 09:26), Max: 98.7 (16 Jul 2023 17:27)  HR: 83 (17 Jul 2023 09:26) (83 - 96)  BP: 118/44 (17 Jul 2023 09:26) (118/44 - 134/64)  BP(mean): 58 (16 Jul 2023 12:31) (58 - 58)  RR: 19 (17 Jul 2023 09:26) (17 - 19)  SpO2: 99% (17 Jul 2023 09:26) (96% - 99%)    Parameters below as of 17 Jul 2023 09:26  Patient On (Oxygen Delivery Method): room air          PHYSICAL EXAM:    .phy      TELEMETRY:    ECG:    LABS:                          7.5    7.57  )-----------( 157      ( 17 Jul 2023 06:25 )             23.0     07-17    141  |  110<H>  |  18  ----------------------------<  206<H>  3.4<L>   |  24  |  1.14    Ca    8.0<L>      17 Jul 2023 10:23    TPro  5.8<L>  /  Alb  2.8<L>  /  TBili  0.2  /  DBili  x   /  AST  12<L>  /  ALT  15  /  AlkPhos  65  07-16    Lipid Panel 07-17 @ 06:25  Chl 81  HDL 36  LDL 31  Trg 66    07-17 @ 10:23  Trop-I  642.75      07-16 @ 22:08  Trop-I  527.98  CK      --  CK-MB   --      07-16 @ 16:15  Trop-I  418.16  CK      --  CK-MB   --      07-16 @ 13:02  Trop-I  356.08        Pro BNP   07-16 @ 22:08  333    D Dimer   07-16 @ 22:08  --      PT/INR - ( 17 Jul 2023 06:25 )   PT: 13.7 sec;   INR: 1.18 ratio         PTT - ( 16 Jul 2023 13:02 )  PTT:33.2 sec  Urinalysis Basic - ( 17 Jul 2023 10:23 )    Color: x / Appearance: x / SG: x / pH: x  Gluc: 206 mg/dL / Ketone: x  / Bili: x / Urobili: x   Blood: x / Protein: x / Nitrite: x   Leuk Esterase: x / RBC: x / WBC x   Sq Epi: x / Non Sq Epi: x / Bacteria: x                  RADIOLOGY & ADDITIONAL STUDIES:     87-year-old male is admitted with complaints of GI bleeding.  Dark black color stools noted.  Prior to that had complaints of bright red blood per rectum a couple days ago.  Then stool became darker and currently black.  Had complaints of heartburn, lower chest and epigastric discomfort, nausea, lethargy and weakness.  Complained of dizziness, feeling faint especially when he stood up.  Noted to be hypotensive.  Severe anemia noted.  Was seen at my office recently and was advised echocardiogram for evaluation of aortic stenosis and stress test for evaluation of shortness of breath.      PAST MEDICAL HX  CAD coronary artery disease  Elevated blood glucose  Hypertensive Cardiomyopathy   HTN (hypertension).   Aortic Stenosis    PAST SURGICAL HX  Cardiac cath  12/20 with ROCAEL to PRDA  History of colonoscopy.        PREVIOUS CARDIAC WORKUP:      Echo: 7/12/23  --The interventricular septum is moderately hypertrophied.  --LV global wall motion is normal.  --LV ejection fraction (55 %) is normal.  --There is mild aortic valve thickening. The aortic valve is moderately calcified. There is moderate aortic stenosis. The estimated aortic valve area is 1.00 cm². The aortic valve has a peak gradient is 47mmHg with a mean of 36mmHg. There is trace aortic regurgitation.  --There is mitral annular calcification. There is mild mitral valve thickening. There is mild mitral stenosis. There is trace mitral regurgitation. Mitral valve area is 2.3cm2.  --There is mild tricuspid regurgitation.  --There is mild pulmonic regurgitation.  --There is mild pulmonary hypertension. Estiamted PASP 37 mm Hg  --There is a trace pericardial effusion.    Stress Test: 6/22/22  Conclusion    ?	Rest myocardial perfusion gated SPECT imaging was performed, showing a left ventricular ejection fraction of 75 %,  ?	Post stress resting myocardial perfusion gated SPECT imaging was performed, showing a left ventricular ejection fraction of 74 %,  ?	There is a small to medium size defect, of mild severity, in the basal inferior and mid inferior wall(s), that is fixed, suggestive with diaphragmatic attenuation artifact and overlying or adjacent bowel activity.  ?	Rest gated analysis showed normal left ventricular function with normal left ventricle size  ?	Post stress analysis showed normal left ventricular function with normal left ventricle size  ?	Gated wall motion analysis shows normal wall motion.  ?	Overall impression: Normal.  ?	Left ventricular perfusion is probably normal.  ?	No ECG evidence of ischemia after administration of IV regadenoson.  ?	SPECT results are limited by artifact and show no definite ischemia or infarct.      Cardiac Cath:  12/16/20  Nonobstructive CAD. Patent stent in RPDA. Estimated LV ejection fraction 75%      ALLERGIES:    No Known Allergies       MEDICATIONS  (STANDING):  atorvastatin 20 milliGRAM(s) Oral at bedtime  budesonide 160 MICROgram(s)/formoterol 4.5 MICROgram(s) Inhaler 2 Puff(s) Inhalation two times a day  lisinopril 2.5 milliGRAM(s) Oral daily  melatonin 3 milliGRAM(s) Oral at bedtime  metoprolol tartrate 25 milliGRAM(s) Oral two times a day  potassium chloride    Tablet ER 40 milliEquivalent(s) Oral once  verapamil  milliGRAM(s) Oral daily    MEDICATIONS  (PRN):  acetaminophen     Tablet .. 650 milliGRAM(s) Oral every 6 hours PRN Temp greater or equal to 38C (100.4F), Mild Pain (1 - 3)  aluminum hydroxide/magnesium hydroxide/simethicone Suspension 30 milliLiter(s) Oral every 6 hours PRN Dyspepsia      FAMILY HX  no relevant hx CAD, HTN      SOCIAL HX    nonsmoker   (16 Jul 2023 18:05)      ROS:     Detailed ten system ROS was performed and was negative except for history as eluded to above.    no fever  no chills  no nausea  no vomiting  no diarrhea  no constipation  no melena  no hematochezia  no chest pain  no palpitations  no sob at rest  + dyspnea on exertion  no cough  no wheezing  no anorexia  no headache  no dizziness  no syncope  no weakness  no myalgia  no dysuria  no polyuria  no hematuria       Vital Signs Last 24 Hrs  T(C): 36.8 (17 Jul 2023 09:26), Max: 37.1 (16 Jul 2023 17:27)  T(F): 98.3 (17 Jul 2023 09:26), Max: 98.7 (16 Jul 2023 17:27)  HR: 83 (17 Jul 2023 09:26) (83 - 96)  BP: 118/44 (17 Jul 2023 09:26) (118/44 - 134/64)  BP(mean): 58 (16 Jul 2023 12:31) (58 - 58)  RR: 19 (17 Jul 2023 09:26) (17 - 19)  SpO2: 99% (17 Jul 2023 09:26) (96% - 99%)    Parameters below as of 17 Jul 2023 09:26  Patient On (Oxygen Delivery Method): room air          PHYSICAL EXAM:    General:                Comfortable, AAO X 3, in no distress. Obese  HEENT:                  Unremarkable. Pallor   Neck:                     Supple, no adenopathy, no thyromegaly, no JVD, no bruit.  Chest:                    Clear, B/L symmetric air entry, no tachypnea  Heart:                     S1, S2 normal, + murmur.  Abdomen:              Soft, non-tender, bowel sounds active. No palpable masses.  Extremities:           no cyanosis, no edema.   Neurologic:            Grossly nonfocal.       TELEMETRY:  Normal sinus rhythm with no tachy or angel events     ECG:   NSR, no ST T changes of ischemia or infarction.       LABS:                        7.5    7.57  )-----------( 157      ( 17 Jul 2023 06:25 )             23.0     07-17    141  |  110<H>  |  18  ----------------------------<  206<H>  3.4<L>   |  24  |  1.14    Ca    8.0<L>      17 Jul 2023 10:23    TPro  5.8<L>  /  Alb  2.8<L>  /  TBili  0.2  /  DBili  x   /  AST  12<L>  /  ALT  15  /  AlkPhos  65  07-16    Lipid Panel 07-17 @ 06:25  Chl 81  HDL 36  LDL 31  Trg 66    07-17 @ 10:23  Trop-I  642.75    07-16 @ 22:08  Trop-I  527.98    07-16 @ 16:15  Trop-I  418.16      07-16 @ 13:02  Trop-I  356.08        Pro BNP   07-16 @ 22:08  333      PT/INR - ( 17 Jul 2023 06:25 )   PT: 13.7 sec;   INR: 1.18 ratio    PTT - ( 16 Jul 2023 13:02 )  PTT:33.2 sec      RADIOLOGY & ADDITIONAL STUDIES:    < from: Xray Chest 1 View- PORTABLE-Urgent (07.16.23 @ 14:50) >  IMPRESSION: No acute finding at this time.      < from: CT Abdomen and Pelvis w/ IV Cont (07.16.23 @ 14:07) >  FINDINGS:  LOWER CHEST: Within normal limits.    LIVER: Within normal limits.  BILE DUCTS: Normal caliber.  GALLBLADDER: Within normal limits.  SPLEEN: Within normal limits.  PANCREAS: Within normal limits.  ADRENALS: Within normal limits.  KIDNEYS/URETERS: Within normal limits.    BLADDER: Within normal limits.  REPRODUCTIVE ORGANS: Prostate is enlarged.    BOWEL: Diverticulosis without evidence of diverticulitis Appendix is not visualized. No evidence of inflammation in the pericecal region.  PERITONEUM: No ascites.  VESSELS: Within normal limits.  RETROPERITONEUM/LYMPH NODES: No lymphadenopathy.  ABDOMINAL WALL: Within normal limits.  BONES: Within normal limits.    IMPRESSION:  Diverticulosis without evidence of diverticulitis. No evidence of acute intra-abdominal pathology.

## 2023-07-17 NOTE — DIETITIAN INITIAL EVALUATION ADULT - OTHER INFO
87 year old male w CAD s/p stent, hypertensive cardiomyopathy, elevated blood glucose and HTN presents to the ED c/o rectal bleeding,   "black color,"   +nausea, lethargic and weakness,   feels faint with movement daphne if standing   denies abd pain, or diarrhea.   +bright red blood per rectum 2 days ago.    Yesterday noticed that the stool was darker and   today 2 episodes of darker bowel movement.    Patient feeling more weak and more tired.     No travel, no sick contacts  he stopped his asa on Thursday    wife reported that pt had some heartburn, lower chrst/epigastric discomfort/tightness  did not grade for me   not clear what makes it better   activity makes it worse    Saw Cardiology Wednesday and had echo and ekg  scheduled for stress this week, Wednesday 87 year old male w CAD s/p stent, hypertensive cardiomyopathy, elevated blood glucose and HTN presents to the ED c/o rectal bleeding,   "black color,"   +nausea, lethargic and weakness,   feels faint with movement daphne if standing   denies abd pain, or diarrhea.   +bright red blood per rectum 2 days ago.    Yesterday noticed that the stool was darker and   today 2 episodes of darker bowel movement.    Patient feeling more weak and more tired.     No travel, no sick contacts  he stopped his asa on Thursday    wife reported that pt had some heartburn, lower chrst/epigastric discomfort/tightness  did not grade for me   not clear what makes it better   activity makes it worse    Saw Cardiology Wednesday and had echo and ekg  scheduled for stress this week, Wednesday    Admit dx is GI hemorrhage  EMR weight is 113 kg   250#  Pt in chair on interview  /Last BM 7/16  Pt has elevated glucose, not on DM meds at home  Suggest check HgbA1c  Recommendations to follow in Plan/Intervention

## 2023-07-17 NOTE — CONSULT NOTE ADULT - ASSESSMENT
87 year old man with CAD s/p stent last year, HTN, prior diverticular bleed, here for hematochezia.     Likely stable diverticular bleeding. Bright red then turned darker, painless, with diverticulosis and prior history of this kind of bleeding.   Trop's increasing but likely demand.   No plans on endoscopic intervention, likely diagnostic at this point.   Defer work up for trops to Dr. Grover, but no strict GI contraindication to providing patient with any medications he needs for his heart health.   Can advance diet as tolerated today.   Discussed with patient and wife. 
NSTEMI, No chest pain. Possibly secondary to demand, and severe anemia  CAD by history  AS  Anemia of acute blood loss from GI bleed  Hypokalemia    Suggest:    Medical optimization of NSTEMI and CAD for now.  Patient is hemodynamically stable and chest pain-free.  Will require GI work-up first to evaluate source of bleeding.  Management of CAD with anticoagulation especially if cardiac catheterization is planned will be difficult in the setting of GI bleed.  Repeat echo for wall motion abnormality and LV function.  Recently noted to have moderate aortic stenosis on outpatient echocardiogram with peak gradient of 47 mmHg.  There is also component of LVOT obstruction.  Keep optimal fluid status.  Avoid aggressive fluid shifts, both fluid overload and hypovolemia.  Current cardiac status is stable for proceeding with upper and lower endoscopy.  Patient remains hemodynamically stable and chest pain-free on current medications.    Rule out GI AV malformations in setting of aortic stenosis.  Advised transfusion for anemia.  Patient and his wife are refusing.  Replete potassium.  D/W Dr Briones  D/W pt's wife at bedside

## 2023-07-17 NOTE — DIETITIAN INITIAL EVALUATION ADULT - ADD RECOMMEND
Diet advance to Low fiber, pt will consume and tolerate >75% needs   MVI w/ minerals daily to ensure 100% RDA met   Consider adding thiamine 100 mg daily 2/2 poor PO intake/ malnutrition  Monitor bowel movements, if no BM for >3 days, consider implementing bowel regimen.  Glucerna bid on diet advance  Check HgbA1c  Monitor PO intake, tolerance, labs and weight.

## 2023-07-17 NOTE — DIETITIAN INITIAL EVALUATION ADULT - PERTINENT LABORATORY DATA
07-16    142  |  110<H>  |  24<H>  ----------------------------<  166<H>  4.1   |  27  |  1.15    Ca    8.1<L>      16 Jul 2023 13:02    TPro  5.8<L>  /  Alb  2.8<L>  /  TBili  0.2  /  DBili  x   /  AST  12<L>  /  ALT  15  /  AlkPhos  65  07-16

## 2023-07-17 NOTE — DIETITIAN NUTRITION RISK NOTIFICATION - INADEQUATE ENERGY INTAKE
Interval History: No new complaints     Review of Systems   Constitutional: Negative for activity change, appetite change, chills, diaphoresis, fatigue and fever.   HENT: Negative for congestion and dental problem.    Eyes: Negative for discharge and itching.   Respiratory: Negative for cough, choking, shortness of breath and stridor.    Cardiovascular: Negative for chest pain, palpitations and leg swelling.   Gastrointestinal: Negative for abdominal pain, blood in stool, constipation, diarrhea, nausea and rectal pain.   Genitourinary: Negative for difficulty urinating, dyspareunia and enuresis.   Musculoskeletal: Positive for arthralgias and neck pain.   Neurological: Negative for dizziness.   Hematological: Negative for adenopathy.   Psychiatric/Behavioral: Negative for agitation and behavioral problems.     Objective:     Vital Signs (Most Recent):  Temp: 98.3 °F (36.8 °C) (11/14/20 0753)  Pulse: 94 (11/14/20 0753)  Resp: 18 (11/14/20 0945)  BP: (!) 150/78 (11/14/20 0753)  SpO2: 98 % (11/14/20 0753) Vital Signs (24h Range):  Temp:  [98.3 °F (36.8 °C)-99.3 °F (37.4 °C)] 98.3 °F (36.8 °C)  Pulse:  [84-94] 94  Resp:  [16-20] 18  SpO2:  [94 %-98 %] 98 %  BP: (132-150)/(59-78) 150/78     Weight: 81.3 kg (179 lb 3.7 oz)  Body mass index is 27.25 kg/m².    Intake/Output Summary (Last 24 hours) at 11/14/2020 1016  Last data filed at 11/14/2020 0100  Gross per 24 hour   Intake 480 ml   Output --   Net 480 ml      Physical Exam  Vitals signs and nursing note reviewed.   Constitutional:       General: She is not in acute distress.     Appearance: Normal appearance. She is not ill-appearing, toxic-appearing or diaphoretic.   HENT:      Head: Normocephalic and atraumatic.   Cardiovascular:      Rate and Rhythm: Normal rate and regular rhythm.      Heart sounds: No murmur. No gallop.    Pulmonary:      Effort: Pulmonary effort is normal. No respiratory distress.      Breath sounds: No stridor. No wheezing or rales.   Chest:       Chest wall: No tenderness.   Abdominal:      General: Abdomen is flat. Bowel sounds are normal.      Tenderness: There is no abdominal tenderness. There is no guarding or rebound.   Neurological:      General: No focal deficit present.      Mental Status: She is alert and oriented to person, place, and time.   Psychiatric:         Mood and Affect: Mood normal.         Behavior: Behavior normal.         Thought Content: Thought content normal.         Significant Labs:   BMP:   Recent Labs   Lab 11/14/20  0509   GLU 93      K 4.4      CO2 23   BUN 44*   CREATININE 4.6*   CALCIUM 9.7     CBC:   Recent Labs   Lab 11/13/20  0410 11/14/20  0509   WBC 9.43 8.16   HGB 7.5* 7.5*   HCT 22.6* 23.3*    218       Significant Imaging:    </= 50% for >/= 5 days

## 2023-07-17 NOTE — DIETITIAN INITIAL EVALUATION ADULT - ORAL INTAKE PTA/DIET HISTORY
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  Pt's wife has diabetes, they try to eat low refined carbs, and a good amount of vegetables.  He typically eats two meals a day.  Recently, PO intake has been poor x 6 days, he has not felt well.  PO intake estimated < 50% ENN > 5 days.

## 2023-07-17 NOTE — DIETITIAN INITIAL EVALUATION ADULT - NAME AND PHONE
Roro Gaston RDN, CDN, [facility]      [phone]   [email or web address]

## 2023-07-17 NOTE — DIETITIAN INITIAL EVALUATION ADULT - PERTINENT MEDS FT
MEDICATIONS  (STANDING):  atorvastatin 20 milliGRAM(s) Oral at bedtime  budesonide 160 MICROgram(s)/formoterol 4.5 MICROgram(s) Inhaler 2 Puff(s) Inhalation two times a day  lisinopril 2.5 milliGRAM(s) Oral daily  melatonin 3 milliGRAM(s) Oral at bedtime  metoprolol tartrate 25 milliGRAM(s) Oral two times a day  pantoprazole Infusion 8 mG/Hr (10 mL/Hr) IV Continuous <Continuous>  verapamil  milliGRAM(s) Oral daily    MEDICATIONS  (PRN):  acetaminophen     Tablet .. 650 milliGRAM(s) Oral every 6 hours PRN Temp greater or equal to 38C (100.4F), Mild Pain (1 - 3)  aluminum hydroxide/magnesium hydroxide/simethicone Suspension 30 milliLiter(s) Oral every 6 hours PRN Dyspepsia

## 2023-07-17 NOTE — CONSULT NOTE ADULT - PROVIDER SPECIALTY LIST ADULT
AM Hospitalist Daily Progress Note      Patient: Gokul Ribeiro Date: 2021   : 1957 Attending: Patrice Lopez MD   63 year old male      Chief Complaint: Paresethesias    Subjective: Feels good today. Was up in [place] with OT and said he is unsteady and lists to the side. Discussed rehab with him, open to thinking about it. No chest pain or LH when up. Wants to eat and frustrated he is NPO.     Problem List:   Patient Active Problem List   Diagnosis   • Intractable vomiting with nausea   • Alcohol withdrawal with complication with inpatient treatment (CMS/HCC)   • Low back pain   • Dehydration   • Lactic acidosis   • Hypomagnesemia   • Left-sided weakness   • Alcohol abuse   • Hypertensive urgency   • History of stroke   • History of Right subdural hematoma (post traumatic)   • H/O traumatic subdural hematoma   • Depression with anxiety   • Essential hypertension   • Tobacco abuse   • Encephalopathy chronic   • Hypokalemia   • Hypoalbuminemia   • Elevated AST (SGOT)   • Chest pain   • SOB (shortness of breath)   • Cough   • SOB (shortness of breath)   • Fever   • GERD (gastroesophageal reflux disease)   • Alcohol withdrawal seizure without complication (CMS/HCC)   • Hypokalemia   • History of seizure   • Subdural hematoma (CMS/HCC)   • Transaminitis   • Compression fracture of T3 vertebra (CMS/HCC)   • Elevated serum GGT level   • Anemia   • Hypokalemia   • Left sided numbness   • Gait abnormality   • Weakness generalized       Allergies: ALLERGIES:  No Known Allergies    Medications/Infusions: Reviewed    Review of Systems:   Reviewed and listed in the subjective above.     Vital Last Value 24 Hour Range   Temperature 97.2 °F (36.2 °C) (21 0424) Temp  Min: 97.2 °F (36.2 °C)  Max: 98 °F (36.7 °C)   Pulse 61 (21 0424) Pulse  Min: 61  Max: 75   Respiratory 20 (21 1043) Resp  Min: 18  Max: 20   Non-Invasive  Blood Pressure (!) 140/84 (21 0945) BP  Min: 132/65  Max: 163/90    Pulse Oximetry 96 % (02/25/21 0424) SpO2  Min: 95 %  Max: 98 %     Vital Today Admitted   Weight 60 kg (02/20/21 1154) Weight: 61.7 kg (02/20/21 0823)   Height N/A Height: 5' 6\" (167.6 cm) (02/20/21 0823)   BMI N/A BMI (Calculated): 21.95 (02/20/21 0823)       Intake/Output:    Intake/Output Summary (Last 24 hours) at 2/25/2021 1233  Last data filed at 2/25/2021 0852  Gross per 24 hour   Intake 100 ml   Output --   Net 100 ml       Physical Exam:   GENERAL: well nourished, awake, alert    SKIN normal color, normal texture, normal turgor, no skin rashes, no atypical appearing skin lesions and no bruises  LUNGS: lungs are clear to auscultation with normal inspiratory/expiratory sounds, no rales, no rhonchi and no wheezes  HEART: normal rate and rhythm, S1 and S2 normal, no S3 or S4, no murmurs and no extra heart sounds  ABDOMEN: abdomen is soft, normal active bowel sounds, nontender and without masses  NEUROLOGIC: A&O x3, cooperative. No focal deficit   EXTREMITIES: no edema, moves all extremities     Laboratory Results:   Recent Labs   Lab 02/25/21  0448 02/24/21  0450 02/21/21  1817 02/21/21  0518  02/20/21  1338  02/20/21  0828  02/20/21  0825   WBC  --  6.9  --  5.4  --   --   --  9.5  --   --    HCT  --  40.8  --  38.3*  --   --   --  42.2  --   --    HGB  --  12.9*  --  12.6*  --   --   --  14.1  --   --    PLT  --  145  --  164  --   --   --  188  --   --    INR  --   --   --   --   --   --   --  0.9  --   --    PTT  --   --   --   --   --   --   --  26  --   --    SODIUM  --  142  --  139  --  136  --  134*   < >  --    POTASSIUM 3.9 3.8 4.2 3.4   < > 3.7  --  3.5   < >  --    CHLORIDE  --  108*  --  103  --  99  --  98   < >  --    CO2  --  26  --  29  --  28  --  25   < >  --    CALCIUM  --  9.1  --  9.0  --  9.4  --  9.5   < >  --    GLUCOSE  --  76  --  99  --  90  --  79   < >  --    BUN  --  15  --  20  --  9  --  8   < >  --    CREATININE  --  0.82  --  0.83  --  0.69   < > 0.68  --   --    AST  --   29  --  39*  --  52*  --  54*   < >  --    GPT  --  37  --  29  --  40  --  45   < >  --    ALKPT  --  105  --  106  --  124*  --  129*   < >  --    BILIRUBIN  --  0.5  --  0.7  --  0.9  --  0.8   < >  --    ALBUMIN  --  3.3*  --  3.2*  --  4.1  --  4.3   < >  --    LIPA  --   --   --   --   --   --   --   --   --  108    < > = values in this interval not displayed.       DVT/VTE Prophylaxis:  VTE Pharmacologic Prophylaxis: Yes heparin   VTE Mechanical Prophylaxis: Yes      Assessment/Plan:   Left upper extremity paresthesias, left lower extremity weakness, improving  History of right MCA territory infarct  History of right SDH s/p craniotomy  Alcohol abuse with withdrawal  Acute toxic metabolic encephalopathy  Headache  Essential hypertension  Dyslipidemia  Anxiety/depression  GERD     Plan:  - mentation has significantly improved   - CT head/CTA head/neck with no acute intracranial findings. MRI with chronic right MCA infarct, no acute changes   - Decreased Depakote to 125 mg daily   - no longer on benzos    - EEG done, no epileptiform discharge but indicates previously known structural defect from stroke    - D/W Speech therapist. Video swallow today. OK for mechanical soft diet with nectar thick liquids   - Psych consulted for ETOH  - Continue amlodipine, mirtazapine, PPI, PRN Fioricet  - Encourage smoking & ETOH cessation  - PT/OT recommending [facility]ATH[facility] vs post acute therapy, patient willing to consider as he does live alone    - SW following for d/c planning- appreciate assistance  - offered to call his daughter but he declined       Janee Flynn PA-C  [facility]  2/25/2021  12:33 PM     Attending addendum:  Patient is evaluated with Janee Flynn PA-C.  He is awake and more appropriate today.  He is aware of plan for video swallow study today.  He did initially declined rehabilitation but after it was explained to him that we are talking about physical rehab and not alcohol, he states he will think  about it.  Continue with current dose of Depakote.    Patrice Lopez MD              Cardiology

## 2023-07-18 LAB
ANION GAP SERPL CALC-SCNC: 3 MMOL/L — LOW (ref 5–17)
BUN SERPL-MCNC: 18 MG/DL — SIGNIFICANT CHANGE UP (ref 7–23)
CALCIUM SERPL-MCNC: 7.8 MG/DL — LOW (ref 8.5–10.1)
CHLORIDE SERPL-SCNC: 115 MMOL/L — HIGH (ref 96–108)
CO2 SERPL-SCNC: 26 MMOL/L — SIGNIFICANT CHANGE UP (ref 22–31)
CREAT SERPL-MCNC: 1.12 MG/DL — SIGNIFICANT CHANGE UP (ref 0.5–1.3)
EGFR: 64 ML/MIN/1.73M2 — SIGNIFICANT CHANGE UP
GLUCOSE SERPL-MCNC: 131 MG/DL — HIGH (ref 70–99)
HCT VFR BLD CALC: 21.6 % — LOW (ref 39–50)
HCT VFR BLD CALC: 24.8 % — LOW (ref 39–50)
HGB BLD-MCNC: 7.2 G/DL — LOW (ref 13–17)
HGB BLD-MCNC: 8.3 G/DL — LOW (ref 13–17)
MCHC RBC-ENTMCNC: 31.2 PG — SIGNIFICANT CHANGE UP (ref 27–34)
MCHC RBC-ENTMCNC: 31.3 PG — SIGNIFICANT CHANGE UP (ref 27–34)
MCHC RBC-ENTMCNC: 33.3 GM/DL — SIGNIFICANT CHANGE UP (ref 32–36)
MCHC RBC-ENTMCNC: 33.5 GM/DL — SIGNIFICANT CHANGE UP (ref 32–36)
MCV RBC AUTO: 93.5 FL — SIGNIFICANT CHANGE UP (ref 80–100)
MCV RBC AUTO: 93.6 FL — SIGNIFICANT CHANGE UP (ref 80–100)
PLATELET # BLD AUTO: 153 K/UL — SIGNIFICANT CHANGE UP (ref 150–400)
PLATELET # BLD AUTO: 165 K/UL — SIGNIFICANT CHANGE UP (ref 150–400)
POTASSIUM SERPL-MCNC: 4.2 MMOL/L — SIGNIFICANT CHANGE UP (ref 3.5–5.3)
POTASSIUM SERPL-SCNC: 4.2 MMOL/L — SIGNIFICANT CHANGE UP (ref 3.5–5.3)
RBC # BLD: 2.31 M/UL — LOW (ref 4.2–5.8)
RBC # BLD: 2.65 M/UL — LOW (ref 4.2–5.8)
RBC # FLD: 13.5 % — SIGNIFICANT CHANGE UP (ref 10.3–14.5)
RBC # FLD: 14.2 % — SIGNIFICANT CHANGE UP (ref 10.3–14.5)
SODIUM SERPL-SCNC: 144 MMOL/L — SIGNIFICANT CHANGE UP (ref 135–145)
WBC # BLD: 7.43 K/UL — SIGNIFICANT CHANGE UP (ref 3.8–10.5)
WBC # BLD: 7.56 K/UL — SIGNIFICANT CHANGE UP (ref 3.8–10.5)
WBC # FLD AUTO: 7.43 K/UL — SIGNIFICANT CHANGE UP (ref 3.8–10.5)
WBC # FLD AUTO: 7.56 K/UL — SIGNIFICANT CHANGE UP (ref 3.8–10.5)

## 2023-07-18 PROCEDURE — 99233 SBSQ HOSP IP/OBS HIGH 50: CPT

## 2023-07-18 PROCEDURE — 93306 TTE W/DOPPLER COMPLETE: CPT | Mod: 26

## 2023-07-18 RX ADMIN — Medication 240 MILLIGRAM(S): at 09:41

## 2023-07-18 RX ADMIN — Medication 81 MILLIGRAM(S): at 09:45

## 2023-07-18 RX ADMIN — Medication 25 MILLIGRAM(S): at 09:42

## 2023-07-18 RX ADMIN — ATORVASTATIN CALCIUM 20 MILLIGRAM(S): 80 TABLET, FILM COATED ORAL at 21:39

## 2023-07-18 RX ADMIN — Medication 3 MILLIGRAM(S): at 21:39

## 2023-07-18 RX ADMIN — BUDESONIDE AND FORMOTEROL FUMARATE DIHYDRATE 2 PUFF(S): 160; 4.5 AEROSOL RESPIRATORY (INHALATION) at 21:56

## 2023-07-18 RX ADMIN — LISINOPRIL 2.5 MILLIGRAM(S): 2.5 TABLET ORAL at 09:41

## 2023-07-18 NOTE — PROGRESS NOTE ADULT - NUTRITIONAL ASSESSMENT
This patient has been assessed with a concern for Malnutrition and has been determined to have a diagnosis/diagnoses of Severe protein-calorie malnutrition.    This patient is being managed with:   Diet DASH/TLC-  Sodium & Cholesterol Restricted  Entered: Jul 17 2023  9:24AM  
This patient has been assessed with a concern for Malnutrition and has been determined to have a diagnosis/diagnoses of Severe protein-calorie malnutrition.    This patient is being managed with:   Diet DASH/TLC-  Sodium & Cholesterol Restricted  Entered: Jul 17 2023  9:24AM

## 2023-07-18 NOTE — PROGRESS NOTE ADULT - ASSESSMENT
87 year old male w CAD s/p stent, hypertensive cardiomyopathy, elevated blood glucose and HTN presents to the ED c/o rectal bleeding,     #Acute blood loss anemia, sx'atic GIB, likely LGIB 2/2 diverticular bleed (has hx of same)  -orthostatics neg but did have low BP overall, s/p 1L NS  -trend Hgb, s/p 1u pRBC  -does not need PPI  -appreciate GI input, no plan for scope as would likely only be dx  -likely self limited  -advanced diet    #Elevated trop, hx CAD- likely demand ischemia in setting of above  -now downtrended  -had recent outpt echo showing nl EF, LVH, mod AS, mild MS  -echo here to eval for SWMA, done, read pending  -likely plan for cath as outpt  -ASA (ok to resume per GI), statin, BB, ACEi  -appreciate Dr. Grover input    #DVT ppx- SCDs    Possible d/c 7/19 if Hgb stable. Outpt cath.

## 2023-07-18 NOTE — PHYSICAL THERAPY INITIAL EVALUATION ADULT - MODALITIES TREATMENT COMMENTS
pt left seated in chair post Eval; HM in place; spouse present; callbell in reach; pt instructed not to get up alone; call nursing for assist; parisa well; denied pain

## 2023-07-18 NOTE — PROGRESS NOTE ADULT - TIME BILLING
Time spent  coordinating the patient's care. This includes reviewing documentation pertinent to this admission, results and imaging. Further tests, medications, and procedures have been ordered as indicated. Laboratory results and the plan of care were communicated to the patient and wife. Discussed care plan with consultants including cards, GI. Supporting documentation was completed and added to the patient's chart.
Time spent  coordinating the patient's care. This includes reviewing documentation pertinent to this admission, results and imaging. Further tests, medications, and procedures have been ordered as indicated. Laboratory results and the plan of care were communicated to the patient and wife. Discussed care plan with consultants including GI, cards. Supporting documentation was completed and added to the patient's chart.

## 2023-07-18 NOTE — PHYSICAL THERAPY INITIAL EVALUATION ADULT - CRITERIA FOR SKILLED THERAPEUTIC INTERVENTIONS
will attempt completion of Eval @ later date; pt currently awaiting transfusion due to low H&H: 7.2/21.6; further course of PT intervention pending

## 2023-07-18 NOTE — PHYSICAL THERAPY INITIAL EVALUATION ADULT - LEVEL OF INDEPENDENCE: SIT/STAND, REHAB EVAL
held transfer / gait assessment due to low H&H: 7.2/21.6; pt awaiting transfusion as per verbal conversation with Dr. Briones 7/18/23 [id number]; spouse reports pt c/o dizziness with ambulation @ times

## 2023-07-18 NOTE — PROGRESS NOTE ADULT - ASSESSMENT
NSTEMI, No chest pain. Possibly secondary to demand, and severe anemia  CAD by history  AS  Anemia of acute blood loss from GI bleed  Hypokalemia    Suggest:    Medical optimization of NSTEMI and CAD for now.  Patient is hemodynamically stable and chest pain-free.  Will require GI work-up first to evaluate source of bleeding.  Management of CAD with anticoagulation especially if cardiac catheterization is planned will be difficult in the setting of GI bleed.  Repeat echo for wall motion abnormality and LV function.  Recently noted to have moderate aortic stenosis on outpatient echocardiogram with peak gradient of 47 mmHg.  There is also component of LVOT obstruction.  Keep optimal fluid status.  Avoid aggressive fluid shifts, both fluid overload and hypovolemia.  Current cardiac status is stable for proceeding with upper and lower endoscopy.  Patient remains hemodynamically stable and chest pain-free on current medications.    Rule out GI AV malformations in setting of aortic stenosis.  Advised transfusion for anemia.  Patient and his wife are agreeable.  Hypernatremia developing. Follow BMP.  Increase free water. Monitor BP  D/W Dr Briones  D/W pt's wife at bedside    ADDENDUM:  D/W Dr Boo - possibly diverticular bleed. No further active bleed. No endoscopy planned at this time. No contraindication for anticoagulation or antiplatelet therapy.     Will medically manage for now. Out pt cardiac cath will be arranged. Low range Tr-I in setting of GI bleed.

## 2023-07-18 NOTE — PROGRESS NOTE ADULT - SUBJECTIVE AND OBJECTIVE BOX
HOSPITALIST ATTENDING PROGRESS NOTE    Chart and meds reviewed.  Patient seen and examined.    CC: GIB    Subjective: Yesterday afternoon had BRBPR and melena. Denies CP, SOB.     All other systems reviewed and found to be negative with the exception of what has been described above.    MEDICATIONS  (STANDING):  aspirin enteric coated 81 milliGRAM(s) Oral daily  atorvastatin 20 milliGRAM(s) Oral at bedtime  budesonide 160 MICROgram(s)/formoterol 4.5 MICROgram(s) Inhaler 2 Puff(s) Inhalation two times a day  lisinopril 2.5 milliGRAM(s) Oral daily  melatonin 3 milliGRAM(s) Oral at bedtime  metoprolol tartrate 25 milliGRAM(s) Oral two times a day  verapamil  milliGRAM(s) Oral daily    MEDICATIONS  (PRN):  acetaminophen     Tablet .. 650 milliGRAM(s) Oral every 6 hours PRN Temp greater or equal to 38C (100.4F), Mild Pain (1 - 3)  aluminum hydroxide/magnesium hydroxide/simethicone Suspension 30 milliLiter(s) Oral every 6 hours PRN Dyspepsia      VITALS:  T(F): 98 (07-18-23 @ 15:20), Max: 98.2 (07-17-23 @ 20:37)  HR: 67 (07-18-23 @ 15:20) (67 - 99)  BP: 104/40 (07-18-23 @ 15:20) (87/38 - 117/45)  RR: 18 (07-18-23 @ 15:20) (18 - 18)  SpO2: 98% (07-18-23 @ 15:20) (98% - 100%)  Wt(kg): --    I&O's Summary    18 Jul 2023 07:01  -  18 Jul 2023 16:20  --------------------------------------------------------  IN: 344 mL / OUT: 0 mL / NET: 344 mL        CAPILLARY BLOOD GLUCOSE          PHYSICAL EXAM:  Gen: NAD  HEENT:  pupils equal and reactive, EOMI, no oropharyngeal lesions, erythema, exudates, oral thrush  NECK:   supple, no carotid bruits, no palpable lymph nodes, no thyromegaly  CV:  +S1, +S2, regular, no murmurs or rubs  RESP:   lungs clear to auscultation bilaterally, no wheezing, rales, rhonchi, good air entry bilaterally  BREAST:  not examined  GI:  abdomen soft, non-tender, non-distended, normal BS, no bruits, no abdominal masses, no palpable masses  RECTAL:  not examined  :  not examined  MSK:   normal muscle tone, no atrophy, no rigidity, no contractions  EXT:  no clubbing, no cyanosis, no edema, no calf pain, swelling or erythema  VASCULAR:  pulses equal and symmetric in the upper and lower extremities  NEURO:  AAOX3, no focal neurological deficits, follows all commands, able to move extremities spontaneously  SKIN:  no ulcers, lesions or rashes    LABS:                            7.2    7.56  )-----------( 153      ( 18 Jul 2023 06:57 )             21.6     07-18    144  |  115<H>  |  18  ----------------------------<  131<H>  4.2   |  26  |  1.12    Ca    7.8<L>      18 Jul 2023 06:57            PT/INR - ( 17 Jul 2023 06:25 )   PT: 13.7 sec;   INR: 1.18 ratio           Urinalysis Basic - ( 18 Jul 2023 06:57 )    Color: x / Appearance: x / SG: x / pH: x  Gluc: 131 mg/dL / Ketone: x  / Bili: x / Urobili: x   Blood: x / Protein: x / Nitrite: x   Leuk Esterase: x / RBC: x / WBC x   Sq Epi: x / Non Sq Epi: x / Bacteria: x    CULTURES:  no new    Additional results/Imaging, I have personally reviewed:  CT a/p w iv contrast 7/16/23: Diverticulosis without evidence of diverticulitis. No evidence of acute intra-abdominal pathology.    CXR 7/16/23: No acute finding at this time.    Echo 7/18/23 done read pending  Telemetry, personally reviewed:  7/17/23: sinus 90s  7/18/23: sinus 100s
87-year-old male is admitted with complaints of GI bleeding.  Dark black color stools noted.  Prior to that had complaints of bright red blood per rectum a couple days ago.  Then stool became darker and currently black.  Had complaints of heartburn, lower chest and epigastric discomfort, nausea, lethargy and weakness.  Complained of dizziness, feeling faint especially when he stood up.  Noted to be hypotensive.  Severe anemia noted.  Was seen at my office recently and was advised echocardiogram for evaluation of aortic stenosis and stress test for evaluation of shortness of breath.    Currently, no new complaints. No further GI bleed.  No chest pain, no shortness of breath at rest.  Had echo done earlier today.      PAST MEDICAL HX  CAD coronary artery disease  Elevated blood glucose  Hypertensive Cardiomyopathy   HTN (hypertension).   Aortic Stenosis    PAST SURGICAL HX  Cardiac cath  12/20 with ROCAEL to PRDA  History of colonoscopy.        PREVIOUS CARDIAC WORKUP:      Echo: 7/12/23  --The interventricular septum is moderately hypertrophied.  --LV global wall motion is normal.  --LV ejection fraction (55 %) is normal.  --There is mild aortic valve thickening. The aortic valve is moderately calcified. There is moderate aortic stenosis. The estimated aortic valve area is 1.00 cm². The aortic valve has a peak gradient is 47mmHg with a mean of 36mmHg. There is trace aortic regurgitation.  --There is mitral annular calcification. There is mild mitral valve thickening. There is mild mitral stenosis. There is trace mitral regurgitation. Mitral valve area is 2.3cm2.  --There is mild tricuspid regurgitation.  --There is mild pulmonic regurgitation.  --There is mild pulmonary hypertension. Estiamted PASP 37 mm Hg  --There is a trace pericardial effusion.    Stress Test: 6/22/22  Conclusion    ·	Rest myocardial perfusion gated SPECT imaging was performed, showing a left ventricular ejection fraction of 75 %,  ·	Post stress resting myocardial perfusion gated SPECT imaging was performed, showing a left ventricular ejection fraction of 74 %,  ·	There is a small to medium size defect, of mild severity, in the basal inferior and mid inferior wall(s), that is fixed, suggestive with diaphragmatic attenuation artifact and overlying or adjacent bowel activity.  ·	Rest gated analysis showed normal left ventricular function with normal left ventricle size  ·	Post stress analysis showed normal left ventricular function with normal left ventricle size  ·	Gated wall motion analysis shows normal wall motion.  ·	Overall impression: Normal.  ·	Left ventricular perfusion is probably normal.  ·	No ECG evidence of ischemia after administration of IV regadenoson.  ·	SPECT results are limited by artifact and show no definite ischemia or infarct.      Cardiac Cath:  12/16/20  Nonobstructive CAD. Patent stent in RPDA. Estimated LV ejection fraction 75%    Allergies:   No Known Allergies      MEDICATIONS  (STANDING):  aspirin enteric coated 81 milliGRAM(s) Oral daily  atorvastatin 20 milliGRAM(s) Oral at bedtime  budesonide 160 MICROgram(s)/formoterol 4.5 MICROgram(s) Inhaler 2 Puff(s) Inhalation two times a day  lisinopril 2.5 milliGRAM(s) Oral daily  melatonin 3 milliGRAM(s) Oral at bedtime  metoprolol tartrate 25 milliGRAM(s) Oral two times a day  verapamil  milliGRAM(s) Oral daily    MEDICATIONS  (PRN):  acetaminophen     Tablet .. 650 milliGRAM(s) Oral every 6 hours PRN Temp greater or equal to 38C (100.4F), Mild Pain (1 - 3)  aluminum hydroxide/magnesium hydroxide/simethicone Suspension 30 milliLiter(s) Oral every 6 hours PRN Dyspepsia      Vital Signs Last 24 Hrs  T(C): 37.1 (18 Jul 2023 20:00), Max: 37.1 (18 Jul 2023 20:00)  T(F): 98.7 (18 Jul 2023 20:00), Max: 98.7 (18 Jul 2023 20:00)  HR: 73 (18 Jul 2023 20:00) (67 - 99)  BP: 99/41 (18 Jul 2023 20:00) (87/38 - 117/45)  BP(mean): --  RR: 19 (18 Jul 2023 20:00) (18 - 19)  SpO2: 100% (18 Jul 2023 20:00) (98% - 100%)    Parameters below as of 18 Jul 2023 20:00  Patient On (Oxygen Delivery Method): room air      PHYSICAL EXAM:    General:                Comfortable, AAO X 3, in no distress. Obese  HEENT:                  Unremarkable. Pallor   Neck:                     Supple, no adenopathy, no thyromegaly, no JVD, no bruit.  Chest:                    Clear, B/L symmetric air entry, no tachypnea  Heart:                     S1, S2 normal, + murmur.  Abdomen:              Soft, non-tender, bowel sounds active. No palpable masses.  Extremities:           no cyanosis, no edema.   Neurologic:            Grossly nonfocal.       TELEMETRY:  Normal sinus rhythm with no tachy or angel events     ECG:   NSR, no ST T changes of ischemia or infarction.       LABS:                        8.3    7.43  )-----------( 165      ( 18 Jul 2023 18:10 )             24.8     07-18    144  |  115<H>  |  18  ----------------------------<  131<H>  4.2   |  26  |  1.12    Ca    7.8<L>      18 Jul 2023 06:57        Lipid Panel 07-17 @ 06:25  Chl 81  HDL 36  LDL 31  Trg 66      07-17 @ 13:44  Trop-I 618.05    07-17 @ 10:23  Trop-I  642.75    07-16 @ 22:08  Trop-I  527.98    07-16 @ 16:15  Trop-I  418.16      07-16 @ 13:02  Trop-I  356.08    
HOSPITALIST ATTENDING PROGRESS NOTE    Chart and meds reviewed.  Patient seen and examined.    CC: GIB    Subjective: Denies CP, SOB, dizziness.    All other systems reviewed and found to be negative with the exception of what has been described above.    MEDICATIONS  (STANDING):  aspirin enteric coated 81 milliGRAM(s) Oral daily  atorvastatin 20 milliGRAM(s) Oral at bedtime  budesonide 160 MICROgram(s)/formoterol 4.5 MICROgram(s) Inhaler 2 Puff(s) Inhalation two times a day  lisinopril 2.5 milliGRAM(s) Oral daily  melatonin 3 milliGRAM(s) Oral at bedtime  metoprolol tartrate 25 milliGRAM(s) Oral two times a day  verapamil  milliGRAM(s) Oral daily    MEDICATIONS  (PRN):  acetaminophen     Tablet .. 650 milliGRAM(s) Oral every 6 hours PRN Temp greater or equal to 38C (100.4F), Mild Pain (1 - 3)  aluminum hydroxide/magnesium hydroxide/simethicone Suspension 30 milliLiter(s) Oral every 6 hours PRN Dyspepsia      VITALS:  T(F): 98.3 (07-17-23 @ 09:26), Max: 98.7 (07-16-23 @ 17:27)  HR: 83 (07-17-23 @ 09:26) (83 - 96)  BP: 118/44 (07-17-23 @ 09:26) (118/44 - 129/65)  RR: 19 (07-17-23 @ 09:26) (18 - 19)  SpO2: 99% (07-17-23 @ 09:26) (96% - 99%)  Wt(kg): --    I&O's Summary    16 Jul 2023 07:01  -  17 Jul 2023 07:00  --------------------------------------------------------  IN: 0 mL / OUT: 400 mL / NET: -400 mL        CAPILLARY BLOOD GLUCOSE          PHYSICAL EXAM:  Gen: NAD  HEENT:  pupils equal and reactive, EOMI, no oropharyngeal lesions, erythema, exudates, oral thrush  NECK:   supple, no carotid bruits, no palpable lymph nodes, no thyromegaly  CV:  +S1, +S2, regular, no murmurs or rubs  RESP:   lungs clear to auscultation bilaterally, no wheezing, rales, rhonchi, good air entry bilaterally  BREAST:  not examined  GI:  abdomen soft, non-tender, non-distended, normal BS, no bruits, no abdominal masses, no palpable masses  RECTAL:  not examined  :  not examined  MSK:   normal muscle tone, no atrophy, no rigidity, no contractions  EXT:  no clubbing, no cyanosis, no edema, no calf pain, swelling or erythema  VASCULAR:  pulses equal and symmetric in the upper and lower extremities  NEURO:  AAOX3, no focal neurological deficits, follows all commands, able to move extremities spontaneously  SKIN:  no ulcers, lesions or rashes    LABS:                            8.1    7.98  )-----------( 171      ( 17 Jul 2023 13:44 )             24.6     07-17    141  |  110<H>  |  18  ----------------------------<  206<H>  3.4<L>   |  24  |  1.14    Ca    8.0<L>      17 Jul 2023 10:23    TPro  5.8<L>  /  Alb  2.8<L>  /  TBili  0.2  /  DBili  x   /  AST  12<L>  /  ALT  15  /  AlkPhos  65  07-16        LIVER FUNCTIONS - ( 16 Jul 2023 13:02 )  Alb: 2.8 g/dL / Pro: 5.8 gm/dL / ALK PHOS: 65 U/L / ALT: 15 U/L / AST: 12 U/L / GGT: x           PT/INR - ( 17 Jul 2023 06:25 )   PT: 13.7 sec;   INR: 1.18 ratio         PTT - ( 16 Jul 2023 13:02 )  PTT:33.2 sec  Urinalysis Basic - ( 17 Jul 2023 10:23 )    Color: x / Appearance: x / SG: x / pH: x  Gluc: 206 mg/dL / Ketone: x  / Bili: x / Urobili: x   Blood: x / Protein: x / Nitrite: x   Leuk Esterase: x / RBC: x / WBC x   Sq Epi: x / Non Sq Epi: x / Bacteria: x    CULTURES:  no new    Additional results/Imaging, I have personally reviewed:  CT a/p w iv contrast 7/16/23: Diverticulosis without evidence of diverticulitis. No evidence of acute intra-abdominal pathology.    CXR 7/16/23: No acute finding at this time.    Telemetry, personally reviewed:  7/17/23: sinus 90s

## 2023-07-19 ENCOUNTER — TRANSCRIPTION ENCOUNTER (OUTPATIENT)
Age: 88
End: 2023-07-19

## 2023-07-19 VITALS
TEMPERATURE: 98 F | OXYGEN SATURATION: 98 % | DIASTOLIC BLOOD PRESSURE: 40 MMHG | HEART RATE: 78 BPM | SYSTOLIC BLOOD PRESSURE: 111 MMHG | RESPIRATION RATE: 18 BRPM

## 2023-07-19 LAB
HCT VFR BLD CALC: 24.1 % — LOW (ref 39–50)
HGB BLD-MCNC: 8.1 G/DL — LOW (ref 13–17)
MCHC RBC-ENTMCNC: 31.4 PG — SIGNIFICANT CHANGE UP (ref 27–34)
MCHC RBC-ENTMCNC: 33.6 GM/DL — SIGNIFICANT CHANGE UP (ref 32–36)
MCV RBC AUTO: 93.4 FL — SIGNIFICANT CHANGE UP (ref 80–100)
PLATELET # BLD AUTO: 178 K/UL — SIGNIFICANT CHANGE UP (ref 150–400)
RBC # BLD: 2.58 M/UL — LOW (ref 4.2–5.8)
RBC # FLD: 14.4 % — SIGNIFICANT CHANGE UP (ref 10.3–14.5)
WBC # BLD: 6.81 K/UL — SIGNIFICANT CHANGE UP (ref 3.8–10.5)
WBC # FLD AUTO: 6.81 K/UL — SIGNIFICANT CHANGE UP (ref 3.8–10.5)

## 2023-07-19 PROCEDURE — 99239 HOSP IP/OBS DSCHRG MGMT >30: CPT

## 2023-07-19 RX ADMIN — Medication 25 MILLIGRAM(S): at 10:52

## 2023-07-19 RX ADMIN — BUDESONIDE AND FORMOTEROL FUMARATE DIHYDRATE 2 PUFF(S): 160; 4.5 AEROSOL RESPIRATORY (INHALATION) at 07:50

## 2023-07-19 RX ADMIN — Medication 240 MILLIGRAM(S): at 10:52

## 2023-07-19 RX ADMIN — Medication 81 MILLIGRAM(S): at 10:52

## 2023-07-19 RX ADMIN — LISINOPRIL 2.5 MILLIGRAM(S): 2.5 TABLET ORAL at 10:52

## 2023-07-19 NOTE — DISCHARGE NOTE NURSING/CASE MANAGEMENT/SOCIAL WORK - PATIENT PORTAL LINK FT
You can access the FollowMyHealth Patient Portal offered by [facility] by registering at the following website: [email or web address]. By joining [facility]’s FollowMyHealth portal, you will also be able to view your health information using other applications (apps) compatible with our system.

## 2023-07-19 NOTE — DISCHARGE NOTE PROVIDER - NSDCMRMEDTOKEN_GEN_ALL_CORE_FT
Aspirin Low Dose 81 mg oral tablet, chewable: 1 tab(s) orally once a day  atorvastatin 20 mg oral tablet: 1 tab(s) orally once a day  lisinopril 2.5 mg oral tablet: 1 tab(s) orally once a day   metoprolol tartrate 25 mg oral tablet: 1 tab(s) orally 2 times a day  Symbicort 160 mcg-4.5 mcg/inh inhalation aerosol: 2 puff(s) inhaled once a day as needed for  verapamil 240 mg/24 hours oral tablet, extended release: 1 tab(s) orally once a day

## 2023-07-19 NOTE — DISCHARGE NOTE PROVIDER - NSDCCPCAREPLAN_GEN_ALL_CORE_FT
PRINCIPAL DISCHARGE DIAGNOSIS  Diagnosis: GI bleed  Assessment and Plan of Treatment: Likely diverticulosis. Hemoglobin now stable.      SECONDARY DISCHARGE DIAGNOSES  Diagnosis: Elevated troponin  Assessment and Plan of Treatment: Take aspirin, lipitor, metoprolol, lisinopril. Follow up with Dr. Grover for cardiac cath.

## 2023-07-19 NOTE — DISCHARGE NOTE PROVIDER - HOSPITAL COURSE
CC: GIB  HPI and Hospital Course: 87 year old male w CAD s/p stent, hypertensive cardiomyopathy, elevated blood glucose and HTN presents to the ED c/o rectal bleeding,     #Acute blood loss anemia, sx'atic GIB, likely LGIB 2/2 diverticular bleed (has hx of same)  -orthostatics neg but did have low BP overall, s/p 1L NS  -s/p 1u pRBC w appropriate response  -does not need PPI  -appreciate GI input, no plan for scope as would likely only be dx  -likely self limited  -advanced diet    #Elevated trop, hx CAD- likely demand ischemia in setting of above  -now downtrended  -had recent outpt echo showing nl EF, LVH, mod AS, mild MS  -echo here w no change, no SWMA (per Dr. Grover wet read)  -likely plan for cath as outpt  -ASA (ok to resume per GI), statin, BB, ACEi  -appreciate Dr. Grover input    #DVT ppx- SCDs    updated family at bedside  I have spent 34 min on day of d/c coordinating care.

## 2023-07-19 NOTE — DISCHARGE NOTE PROVIDER - NSDCPNSUBOBJ_GEN_ALL_CORE
VITALS:  T(F): 98.4 (07-19-23 @ 07:36), Max: 98.7 (07-18-23 @ 20:00)  HR: 82 (07-19-23 @ 07:50) (73 - 82)  BP: 111/40 (07-19-23 @ 07:36) (99/41 - 133/46)  RR: 18 (07-19-23 @ 07:36) (18 - 19)  SpO2: 98% (07-19-23 @ 07:36) (98% - 100%)    PHYSICAL EXAM:  Gen: NAD  HEENT:  pupils equal and reactive, EOMI, no oropharyngeal lesions, erythema, exudates, oral thrush  NECK:   supple, no carotid bruits, no palpable lymph nodes, no thyromegaly  CV:  +S1, +S2, regular, no murmurs or rubs  RESP:   lungs clear to auscultation bilaterally, no wheezing, rales, rhonchi, good air entry bilaterally  BREAST:  not examined  GI:  abdomen soft, non-tender, non-distended, normal BS, no bruits, no abdominal masses, no palpable masses  RECTAL:  not examined  :  not examined  MSK:   normal muscle tone, no atrophy, no rigidity, no contractions  EXT:  no clubbing, no cyanosis, no edema, no calf pain, swelling or erythema  VASCULAR:  pulses equal and symmetric in the upper and lower extremities  NEURO:  AAOX3, no focal neurological deficits, follows all commands, able to move extremities spontaneously  SKIN:  no ulcers, lesions or rashes    CT a/p w iv contrast 7/16/23: Diverticulosis without evidence of diverticulitis. No evidence of acute intra-abdominal pathology.    CXR 7/16/23: No acute finding at this time.    Echo 7/18/23: no change from prior, no SWMA (wet read by Dr. Grover)

## 2023-07-19 NOTE — DISCHARGE NOTE PROVIDER - PROVIDER TOKENS
PROVIDER:[TOKEN:[[id number]],FOLLOWUP:[1 week]],PROVIDER:[TOKEN:[[id number]],FOLLOWUP:[2 weeks]]

## 2023-07-19 NOTE — DISCHARGE NOTE PROVIDER - DETAILS OF MALNUTRITION DIAGNOSIS/DIAGNOSES
This patient has been assessed with a concern for Malnutrition and was treated during this hospitalization for the following Nutrition diagnosis/diagnoses:     -  07/17/2023: Severe protein-calorie malnutrition

## 2023-07-19 NOTE — DISCHARGE NOTE PROVIDER - NSDCCAREPROVSEEN_GEN_ALL_CORE_FT
Telephone Encounter by Paula Bejarano NCMA at 10/24/17 08:19 AM     Author:  Paula Bejarano NCMA Service:  (none) Author Type:  Certified Medical Assistant     Filed:  10/24/17 08:20 AM Encounter Date:  10/20/2017 Status:  Signed     :  Paula Bejarano NCMA (Certified Medical Assistant)            Refilled request processed per protocol.    Electronically Signed by:    ROBIN Cummings , 10/24/2017[KS1.1T]          Revision History        User Key Date/Time User Provider Type Action    > KS1.1 10/24/17 08:20 AM Paula Bejarano NCMA Certified Medical Assistant Sign    T - Template             Deandra Briones (hospital medicine)  Rhonda Grover (cardiology)  Jarrett Boo (GI)

## 2023-07-19 NOTE — DISCHARGE NOTE PROVIDER - CARE PROVIDER_API CALL
Rhonda Grover  Cardiology  [address] [facility] [place], [place], [place] [address]  Phone: [phone]  Fax: [phone]  Follow Up Time: 1 week    Dharmesh Lim  Family Medicine  [address] [place] [address]  Phone: [phone]  Fax: [phone]  Follow Up Time: 2 weeks

## 2023-07-25 DIAGNOSIS — K57.93 DIVERTICULITIS OF INTESTINE, PART UNSPECIFIED, WITHOUT PERFORATION OR ABSCESS WITH BLEEDING: ICD-10-CM

## 2023-07-25 DIAGNOSIS — Z79.02 LONG TERM (CURRENT) USE OF ANTITHROMBOTICS/ANTIPLATELETS: ICD-10-CM

## 2023-07-25 DIAGNOSIS — Z79.82 LONG TERM (CURRENT) USE OF ASPIRIN: ICD-10-CM

## 2023-07-25 DIAGNOSIS — E87.6 HYPOKALEMIA: ICD-10-CM

## 2023-07-25 DIAGNOSIS — I35.0 NONRHEUMATIC AORTIC (VALVE) STENOSIS: ICD-10-CM

## 2023-07-25 DIAGNOSIS — I42.8 OTHER CARDIOMYOPATHIES: ICD-10-CM

## 2023-07-25 DIAGNOSIS — I10 ESSENTIAL (PRIMARY) HYPERTENSION: ICD-10-CM

## 2023-07-25 DIAGNOSIS — I24.8 OTHER FORMS OF ACUTE ISCHEMIC HEART DISEASE: ICD-10-CM

## 2023-07-25 DIAGNOSIS — E43 UNSPECIFIED SEVERE PROTEIN-CALORIE MALNUTRITION: ICD-10-CM

## 2023-07-25 DIAGNOSIS — D62 ACUTE POSTHEMORRHAGIC ANEMIA: ICD-10-CM

## 2023-07-25 DIAGNOSIS — Z95.5 PRESENCE OF CORONARY ANGIOPLASTY IMPLANT AND GRAFT: ICD-10-CM

## 2023-07-25 DIAGNOSIS — I25.10 ATHEROSCLEROTIC HEART DISEASE OF NATIVE CORONARY ARTERY WITHOUT ANGINA PECTORIS: ICD-10-CM

## 2024-01-23 NOTE — PATIENT PROFILE ADULT - CENTRAL VENOUS CATHETER/PICC LINE
----- Message from Macey Sherman sent at 1/23/2024 11:54 AM CST -----  Regarding: Schedule Procedure  Contact: Pt [phone]  Pt is calling to speak with staff to schedule procedure please call     
no

## 2024-01-27 ENCOUNTER — INPATIENT (INPATIENT)
Facility: HOSPITAL | Age: 89
LOS: 2 days | Discharge: HOME | DRG: 872 | End: 2024-01-30
Attending: STUDENT IN AN ORGANIZED HEALTH CARE EDUCATION/TRAINING PROGRAM | Admitting: STUDENT IN AN ORGANIZED HEALTH CARE EDUCATION/TRAINING PROGRAM
Payer: MEDICARE

## 2024-01-27 VITALS — WEIGHT: 240.08 LBS | HEIGHT: 64 IN

## 2024-01-27 DIAGNOSIS — Z98.890 OTHER SPECIFIED POSTPROCEDURAL STATES: Chronic | ICD-10-CM

## 2024-01-27 LAB
APPEARANCE UR: ABNORMAL
BASOPHILS # BLD AUTO: 0.03 K/UL — SIGNIFICANT CHANGE UP (ref 0–0.2)
BASOPHILS NFR BLD AUTO: 0.2 % — SIGNIFICANT CHANGE UP (ref 0–2)
BILIRUB UR-MCNC: NEGATIVE — SIGNIFICANT CHANGE UP
COLOR SPEC: SIGNIFICANT CHANGE UP
DIFF PNL FLD: ABNORMAL
EOSINOPHIL # BLD AUTO: 0.03 K/UL — SIGNIFICANT CHANGE UP (ref 0–0.5)
EOSINOPHIL NFR BLD AUTO: 0.2 % — SIGNIFICANT CHANGE UP (ref 0–6)
GLUCOSE UR QL: NEGATIVE MG/DL — SIGNIFICANT CHANGE UP
HCT VFR BLD CALC: 32.5 % — LOW (ref 39–50)
HGB BLD-MCNC: 10.1 G/DL — LOW (ref 13–17)
IMM GRANULOCYTES NFR BLD AUTO: 0.2 % — SIGNIFICANT CHANGE UP (ref 0–0.9)
KETONES UR-MCNC: ABNORMAL MG/DL
LEUKOCYTE ESTERASE UR-ACNC: ABNORMAL
LYMPHOCYTES # BLD AUTO: 1.18 K/UL — SIGNIFICANT CHANGE UP (ref 1–3.3)
LYMPHOCYTES # BLD AUTO: 8.2 % — LOW (ref 13–44)
MCHC RBC-ENTMCNC: 26.2 PG — LOW (ref 27–34)
MCHC RBC-ENTMCNC: 31.1 GM/DL — LOW (ref 32–36)
MCV RBC AUTO: 84.2 FL — SIGNIFICANT CHANGE UP (ref 80–100)
MONOCYTES # BLD AUTO: 1.46 K/UL — HIGH (ref 0–0.9)
MONOCYTES NFR BLD AUTO: 10.1 % — SIGNIFICANT CHANGE UP (ref 2–14)
NEUTROPHILS # BLD AUTO: 11.66 K/UL — HIGH (ref 1.8–7.4)
NEUTROPHILS NFR BLD AUTO: 81.1 % — HIGH (ref 43–77)
NITRITE UR-MCNC: POSITIVE
PH UR: 5 — SIGNIFICANT CHANGE UP (ref 5–8)
PLATELET # BLD AUTO: 208 K/UL — SIGNIFICANT CHANGE UP (ref 150–400)
PROT UR-MCNC: 30 MG/DL
RBC # BLD: 3.86 M/UL — LOW (ref 4.2–5.8)
RBC # FLD: 16.5 % — HIGH (ref 10.3–14.5)
SP GR SPEC: 1.03 — SIGNIFICANT CHANGE UP (ref 1–1.03)
UROBILINOGEN FLD QL: 1 MG/DL — SIGNIFICANT CHANGE UP (ref 0.2–1)
WBC # BLD: 14.39 K/UL — HIGH (ref 3.8–10.5)
WBC # FLD AUTO: 14.39 K/UL — HIGH (ref 3.8–10.5)

## 2024-01-27 PROCEDURE — 71045 X-RAY EXAM CHEST 1 VIEW: CPT | Mod: 26

## 2024-01-27 PROCEDURE — 99285 EMERGENCY DEPT VISIT HI MDM: CPT

## 2024-01-27 PROCEDURE — 93010 ELECTROCARDIOGRAM REPORT: CPT

## 2024-01-27 RX ORDER — SODIUM CHLORIDE 9 MG/ML
250 INJECTION INTRAMUSCULAR; INTRAVENOUS; SUBCUTANEOUS ONCE
Refills: 0 | Status: DISCONTINUED | OUTPATIENT
Start: 2024-01-27 | End: 2024-01-27

## 2024-01-27 RX ORDER — PIPERACILLIN AND TAZOBACTAM 4; .5 G/20ML; G/20ML
3.38 INJECTION, POWDER, LYOPHILIZED, FOR SOLUTION INTRAVENOUS ONCE
Refills: 0 | Status: COMPLETED | OUTPATIENT
Start: 2024-01-27 | End: 2024-01-27

## 2024-01-27 RX ORDER — VANCOMYCIN HCL 1 G
1750 VIAL (EA) INTRAVENOUS ONCE
Refills: 0 | Status: COMPLETED | OUTPATIENT
Start: 2024-01-27 | End: 2024-01-27

## 2024-01-27 RX ORDER — ACETAMINOPHEN 500 MG
1000 TABLET ORAL ONCE
Refills: 0 | Status: COMPLETED | OUTPATIENT
Start: 2024-01-27 | End: 2024-01-27

## 2024-01-27 RX ADMIN — Medication 400 MILLIGRAM(S): at 23:57

## 2024-01-27 NOTE — ED ADULT NURSE NOTE - NSFALLHARMRISKINTERV_ED_ALL_ED

## 2024-01-27 NOTE — ED PROVIDER NOTE - DIFFERENTIAL DIAGNOSIS
urinary symptoms, r/o UTI. labs and imaging. Decreased activity, fever, as per family (daughter who is HCP and as per patient wishes translating for patient). Patient with elevated Troponin I, hx of cardiac dx, also UTI, likely UTI with demand ischemia, patient of Dr. Yanna Grover. Patient's second troponin I down trending. Admission, abx, cardiology follow up r/o ACS. Spoke with Dr. Stinson. Hospitalist admission appreciated. MS, TELE, SHEREEN. Differential Diagnosis

## 2024-01-27 NOTE — ED PROVIDER NOTE - CARDIAC, MLM
[facility] Normal rate, regular rhythm.  Heart sounds S1, S2.  No rubs or gallops.

## 2024-01-27 NOTE — ED ADULT NURSE NOTE - CHIEF COMPLAINT QUOTE
Pt BIB wheelchair by daughter co urinary symptoms since yesterday. According to Pt's daughter, pt has been having urinary incontinence, dysuria, fever, chills, SOB, mild CP. Pt normally ambulatory but has been feeling winded lately. PMHx cardiomyopathy, HTN, 3 stents. NKDA. AOx4. EKG in progress

## 2024-01-27 NOTE — ED PROVIDER NOTE - PROGRESS NOTE DETAILS
Tita GUERIN: Decreased activity, fever, as per family (daughter who is HCP and as per patient wishes translating for patient). Patient with elevated Troponin I, hx of cardiac dx, also UTI, likely UTI with demand ischemia, patient of Dr. Yanna Grover. Patient's second troponin I down trending. Admission, abx, cardiology follow up r/o ACS. Spoke with Dr. Stinson. Hospitalist admission appreciated. MS, TELE, SHEREEN.

## 2024-01-27 NOTE — ED PROVIDER NOTE - CLINICAL SUMMARY MEDICAL DECISION MAKING FREE TEXT BOX
88-year-old male past medical history hypertension,  CAD status post stent presenting to the emergency department with a few days of urinary continence, dysuria, suprapubic pain associated with 2 days of chills and fevers at home (Tmax 101 Fahrenheit). Given hx and physical, ddx includes but is not limited to sepsis, uti, metabolic derangement, viral syndrome. Plan for ecg, xr, ua, ct, abx. Pt normotensive, concern for cardiac disease so will start with 250cc bolus and continue to monitor. Likely tba. 88-year-old male past medical history hypertension,  CAD status post stent presenting to the emergency department with a few days of urinary continence, dysuria, suprapubic pain associated with 2 days of chills and fevers at home (Tmax 101 Fahrenheit). Given hx and physical, ddx includes but is not limited to sepsis, uti, metabolic derangement, viral syndrome. Plan for ecg, xr, ua, ct, abx. Pt normotensive, concern for cardiac disease so will start with 250cc bolus and continue to monitor. Likely tba.    urinary symptoms, r/o UTI. labs and imaging. Decreased activity, fever, as per family (daughter who is HCP and as per patient wishes translating for patient). Patient with elevated Troponin I, hx of cardiac dx, also UTI, likely UTI with demand ischemia, patient of Dr. Yanna Grover. Patient's second troponin I down trending. Admission, abx, cardiology follow up r/o ACS. Spoke with Dr. Stinson. Hospitalist admission appreciated. MS, TELE, SHEREEN.

## 2024-01-27 NOTE — ED ADULT NURSE NOTE - OBJECTIVE STATEMENT
87 y/o male presents to ED c/o urinary symptoms, fever, aches, SOB. Pt's daughter at bedside translating for pt. Pt states that highest fever was 101, denies taking any medication PTA. Pt reports urinary frequency, pain with urination. PMHx cardiomyopathy, stents placement. Pt placed on tele monitor and pulse ox, VS as charted. No acute distress noted at this time. Safety measures maintained, stretcher locked and in lowest position, both side rails up.

## 2024-01-27 NOTE — ED PROVIDER NOTE - ATTENDING CONTRIBUTION TO CARE
I Everett Rivers MD saw and examined the patient. Resident physician saw and examined the patient under my supervision and I was involved in key steps in care of this patient. I discussed the care of the patient with resident and agree with resident's plan, assessment and care of the patient while in the ED.

## 2024-01-27 NOTE — ED PROVIDER NOTE - OBJECTIVE STATEMENT
88-year-old male past medical history hypertension,  CAD status post stent presenting to the emergency department with a few days of urinary continence, dysuria, suprapubic pain associated with 2 days of chills and fevers at home (Tmax 101 Fahrenheit). Patient has prior history of urinary tract infections and kidney stones.  No nausea, vomiting, diarrhea, constipation.  Denies chest pain, SOB.  No cough, URI symptoms, sore throat.

## 2024-01-27 NOTE — ED ADULT TRIAGE NOTE - CHIEF COMPLAINT QUOTE
Pt BIB wheelchair by daughter co urinary symptoms since yesterday. According to Pt's daughter, pt has been having urinary incontinence, dysuria, fever, chills, SOB, mild CP. Pt normally ambulatory but has been feeling winded lately. PMHx cardiomyopathy, HTN, 3 stents. NKDA. AOx4 Pt BIB wheelchair by daughter co urinary symptoms since yesterday. According to Pt's daughter, pt has been having urinary incontinence, dysuria, fever, chills, SOB, mild CP. Pt normally ambulatory but has been feeling winded lately. PMHx cardiomyopathy, HTN, 3 stents. NKDA. AOx4. EKG in progress

## 2024-01-27 NOTE — ED PROVIDER NOTE - PHYSICAL EXAMINATION
GENERAL: Awake. Alert. NAD. Well nourished.  HEENT: NC/AT, PERRL, EOMI, Conjunctiva pink, no scleral icterus. Airway patent.   LUNGS: CTAB. No wheezes or rales noted.  CARDIAC: Tachycardia  ABDOMEN: No masses noted. Soft, NT, ND, no rebound, no guarding.  EXT: No edema, no calf tenderness, distal pulses 2+ bilaterally  NEURO: A&Ox3. Moving all extremities. Sensation and strength intact throughout  SKIN: Warm and dry.   PSYCH: Normal affect.

## 2024-01-28 DIAGNOSIS — R79.89 OTHER SPECIFIED ABNORMAL FINDINGS OF BLOOD CHEMISTRY: ICD-10-CM

## 2024-01-28 LAB
ALBUMIN SERPL ELPH-MCNC: 3 G/DL — LOW (ref 3.3–5)
ALP SERPL-CCNC: 75 U/L — SIGNIFICANT CHANGE UP (ref 40–120)
ALT FLD-CCNC: 16 U/L — SIGNIFICANT CHANGE UP (ref 12–78)
ANION GAP SERPL CALC-SCNC: 2 MMOL/L — LOW (ref 5–17)
ANION GAP SERPL CALC-SCNC: 3 MMOL/L — LOW (ref 5–17)
APTT BLD: 38.9 SEC — HIGH (ref 24.5–35.6)
AST SERPL-CCNC: 10 U/L — LOW (ref 15–37)
BACTERIA # UR AUTO: ABNORMAL /HPF
BASOPHILS # BLD AUTO: 0.03 K/UL — SIGNIFICANT CHANGE UP (ref 0–0.2)
BASOPHILS NFR BLD AUTO: 0.2 % — SIGNIFICANT CHANGE UP (ref 0–2)
BILIRUB SERPL-MCNC: 0.4 MG/DL — SIGNIFICANT CHANGE UP (ref 0.2–1.2)
BUN SERPL-MCNC: 19 MG/DL — SIGNIFICANT CHANGE UP (ref 7–23)
BUN SERPL-MCNC: 19 MG/DL — SIGNIFICANT CHANGE UP (ref 7–23)
CALCIUM SERPL-MCNC: 8.2 MG/DL — LOW (ref 8.5–10.1)
CALCIUM SERPL-MCNC: 8.7 MG/DL — SIGNIFICANT CHANGE UP (ref 8.5–10.1)
CAST: 2 /LPF — SIGNIFICANT CHANGE UP (ref 0–4)
CHLORIDE SERPL-SCNC: 107 MMOL/L — SIGNIFICANT CHANGE UP (ref 96–108)
CHLORIDE SERPL-SCNC: 107 MMOL/L — SIGNIFICANT CHANGE UP (ref 96–108)
CO2 SERPL-SCNC: 28 MMOL/L — SIGNIFICANT CHANGE UP (ref 22–31)
CO2 SERPL-SCNC: 30 MMOL/L — SIGNIFICANT CHANGE UP (ref 22–31)
CREAT SERPL-MCNC: 1.22 MG/DL — SIGNIFICANT CHANGE UP (ref 0.5–1.3)
CREAT SERPL-MCNC: 1.34 MG/DL — HIGH (ref 0.5–1.3)
EGFR: 51 ML/MIN/1.73M2 — LOW
EGFR: 57 ML/MIN/1.73M2 — LOW
EOSINOPHIL # BLD AUTO: 0.03 K/UL — SIGNIFICANT CHANGE UP (ref 0–0.5)
EOSINOPHIL NFR BLD AUTO: 0.2 % — SIGNIFICANT CHANGE UP (ref 0–6)
FLUAV AG NPH QL: SIGNIFICANT CHANGE UP
FLUBV AG NPH QL: SIGNIFICANT CHANGE UP
GLUCOSE SERPL-MCNC: 144 MG/DL — HIGH (ref 70–99)
GLUCOSE SERPL-MCNC: 162 MG/DL — HIGH (ref 70–99)
HCT VFR BLD CALC: 30.3 % — LOW (ref 39–50)
HGB BLD-MCNC: 9.6 G/DL — LOW (ref 13–17)
IMM GRANULOCYTES NFR BLD AUTO: 0.6 % — SIGNIFICANT CHANGE UP (ref 0–0.9)
INR BLD: 1.22 RATIO — HIGH (ref 0.85–1.18)
LACTATE SERPL-SCNC: 1.5 MMOL/L — SIGNIFICANT CHANGE UP (ref 0.7–2)
LYMPHOCYTES # BLD AUTO: 1.44 K/UL — SIGNIFICANT CHANGE UP (ref 1–3.3)
LYMPHOCYTES # BLD AUTO: 11.2 % — LOW (ref 13–44)
MCHC RBC-ENTMCNC: 26.7 PG — LOW (ref 27–34)
MCHC RBC-ENTMCNC: 31.7 GM/DL — LOW (ref 32–36)
MCV RBC AUTO: 84.4 FL — SIGNIFICANT CHANGE UP (ref 80–100)
MONOCYTES # BLD AUTO: 1.13 K/UL — HIGH (ref 0–0.9)
MONOCYTES NFR BLD AUTO: 8.8 % — SIGNIFICANT CHANGE UP (ref 2–14)
NEUTROPHILS # BLD AUTO: 10.19 K/UL — HIGH (ref 1.8–7.4)
NEUTROPHILS NFR BLD AUTO: 79 % — HIGH (ref 43–77)
NT-PROBNP SERPL-SCNC: 801 PG/ML — HIGH (ref 0–450)
PLATELET # BLD AUTO: 173 K/UL — SIGNIFICANT CHANGE UP (ref 150–400)
POTASSIUM SERPL-MCNC: 3.4 MMOL/L — LOW (ref 3.5–5.3)
POTASSIUM SERPL-MCNC: 4.1 MMOL/L — SIGNIFICANT CHANGE UP (ref 3.5–5.3)
POTASSIUM SERPL-SCNC: 3.4 MMOL/L — LOW (ref 3.5–5.3)
POTASSIUM SERPL-SCNC: 4.1 MMOL/L — SIGNIFICANT CHANGE UP (ref 3.5–5.3)
PROT SERPL-MCNC: 6.8 GM/DL — SIGNIFICANT CHANGE UP (ref 6–8.3)
PROTHROM AB SERPL-ACNC: 13.7 SEC — HIGH (ref 9.5–13)
RBC # BLD: 3.59 M/UL — LOW (ref 4.2–5.8)
RBC # FLD: 16.5 % — HIGH (ref 10.3–14.5)
RBC CASTS # UR COMP ASSIST: 4 /HPF — SIGNIFICANT CHANGE UP (ref 0–4)
RSV RNA NPH QL NAA+NON-PROBE: SIGNIFICANT CHANGE UP
SARS-COV-2 RNA SPEC QL NAA+PROBE: SIGNIFICANT CHANGE UP
SODIUM SERPL-SCNC: 138 MMOL/L — SIGNIFICANT CHANGE UP (ref 135–145)
SODIUM SERPL-SCNC: 139 MMOL/L — SIGNIFICANT CHANGE UP (ref 135–145)
SQUAMOUS # UR AUTO: 2 /HPF — SIGNIFICANT CHANGE UP (ref 0–5)
TROPONIN I, HIGH SENSITIVITY RESULT: 448.94 NG/L — HIGH
TROPONIN I, HIGH SENSITIVITY RESULT: 547.39 NG/L — HIGH
TROPONIN I, HIGH SENSITIVITY RESULT: 664.55 NG/L — HIGH
WBC # BLD: 12.9 K/UL — HIGH (ref 3.8–10.5)
WBC # FLD AUTO: 12.9 K/UL — HIGH (ref 3.8–10.5)
WBC UR QL: 429 /HPF — HIGH (ref 0–5)

## 2024-01-28 PROCEDURE — 74176 CT ABD & PELVIS W/O CONTRAST: CPT | Mod: 26,MA

## 2024-01-28 PROCEDURE — 82570 ASSAY OF URINE CREATININE: CPT

## 2024-01-28 PROCEDURE — 97116 GAIT TRAINING THERAPY: CPT | Mod: GP

## 2024-01-28 PROCEDURE — 93306 TTE W/DOPPLER COMPLETE: CPT

## 2024-01-28 PROCEDURE — 36415 COLL VENOUS BLD VENIPUNCTURE: CPT

## 2024-01-28 PROCEDURE — 85025 COMPLETE CBC W/AUTO DIFF WBC: CPT

## 2024-01-28 PROCEDURE — 99223 1ST HOSP IP/OBS HIGH 75: CPT

## 2024-01-28 PROCEDURE — 83735 ASSAY OF MAGNESIUM: CPT

## 2024-01-28 PROCEDURE — 84156 ASSAY OF PROTEIN URINE: CPT

## 2024-01-28 PROCEDURE — 80048 BASIC METABOLIC PNL TOTAL CA: CPT

## 2024-01-28 PROCEDURE — 97162 PT EVAL MOD COMPLEX 30 MIN: CPT | Mod: GP

## 2024-01-28 PROCEDURE — 84484 ASSAY OF TROPONIN QUANT: CPT

## 2024-01-28 PROCEDURE — 70450 CT HEAD/BRAIN W/O DYE: CPT | Mod: 26,MD

## 2024-01-28 PROCEDURE — 83880 ASSAY OF NATRIURETIC PEPTIDE: CPT

## 2024-01-28 PROCEDURE — 85027 COMPLETE CBC AUTOMATED: CPT

## 2024-01-28 RX ORDER — METOPROLOL TARTRATE 50 MG
25 TABLET ORAL
Refills: 0 | Status: DISCONTINUED | OUTPATIENT
Start: 2024-01-28 | End: 2024-01-28

## 2024-01-28 RX ORDER — BUDESONIDE AND FORMOTEROL FUMARATE DIHYDRATE 160; 4.5 UG/1; UG/1
2 AEROSOL RESPIRATORY (INHALATION)
Refills: 0 | DISCHARGE

## 2024-01-28 RX ORDER — ASPIRIN/CALCIUM CARB/MAGNESIUM 324 MG
324 TABLET ORAL ONCE
Refills: 0 | Status: COMPLETED | OUTPATIENT
Start: 2024-01-28 | End: 2024-01-28

## 2024-01-28 RX ORDER — ASPIRIN/CALCIUM CARB/MAGNESIUM 324 MG
325 TABLET ORAL ONCE
Refills: 0 | Status: DISCONTINUED | OUTPATIENT
Start: 2024-01-28 | End: 2024-01-28

## 2024-01-28 RX ORDER — CEFTRIAXONE 500 MG/1
1000 INJECTION, POWDER, FOR SOLUTION INTRAMUSCULAR; INTRAVENOUS EVERY 24 HOURS
Refills: 0 | Status: DISCONTINUED | OUTPATIENT
Start: 2024-01-28 | End: 2024-01-30

## 2024-01-28 RX ORDER — ASPIRIN/CALCIUM CARB/MAGNESIUM 324 MG
1 TABLET ORAL
Qty: 0 | Refills: 0 | DISCHARGE

## 2024-01-28 RX ORDER — FUROSEMIDE 40 MG
1 TABLET ORAL
Refills: 0 | DISCHARGE

## 2024-01-28 RX ORDER — ATORVASTATIN CALCIUM 80 MG/1
20 TABLET, FILM COATED ORAL AT BEDTIME
Refills: 0 | Status: DISCONTINUED | OUTPATIENT
Start: 2024-01-28 | End: 2024-01-30

## 2024-01-28 RX ORDER — CLOPIDOGREL BISULFATE 75 MG/1
1 TABLET, FILM COATED ORAL
Refills: 0 | DISCHARGE

## 2024-01-28 RX ORDER — ASPIRIN/CALCIUM CARB/MAGNESIUM 324 MG
81 TABLET ORAL DAILY
Refills: 0 | Status: DISCONTINUED | OUTPATIENT
Start: 2024-01-29 | End: 2024-01-30

## 2024-01-28 RX ORDER — VERAPAMIL HCL 240 MG
1 CAPSULE, EXTENDED RELEASE PELLETS 24 HR ORAL
Refills: 0 | DISCHARGE

## 2024-01-28 RX ORDER — LOSARTAN POTASSIUM 100 MG/1
1 TABLET, FILM COATED ORAL
Refills: 0 | DISCHARGE

## 2024-01-28 RX ORDER — LANOLIN ALCOHOL/MO/W.PET/CERES
3 CREAM (GRAM) TOPICAL AT BEDTIME
Refills: 0 | Status: DISCONTINUED | OUTPATIENT
Start: 2024-01-28 | End: 2024-01-30

## 2024-01-28 RX ORDER — HEPARIN SODIUM 5000 [USP'U]/ML
5000 INJECTION INTRAVENOUS; SUBCUTANEOUS EVERY 12 HOURS
Refills: 0 | Status: DISCONTINUED | OUTPATIENT
Start: 2024-01-28 | End: 2024-01-30

## 2024-01-28 RX ORDER — CLOPIDOGREL BISULFATE 75 MG/1
75 TABLET, FILM COATED ORAL DAILY
Refills: 0 | Status: DISCONTINUED | OUTPATIENT
Start: 2024-01-28 | End: 2024-01-30

## 2024-01-28 RX ORDER — ONDANSETRON 8 MG/1
4 TABLET, FILM COATED ORAL EVERY 8 HOURS
Refills: 0 | Status: DISCONTINUED | OUTPATIENT
Start: 2024-01-28 | End: 2024-01-30

## 2024-01-28 RX ORDER — SODIUM CHLORIDE 9 MG/ML
1000 INJECTION INTRAMUSCULAR; INTRAVENOUS; SUBCUTANEOUS ONCE
Refills: 0 | Status: COMPLETED | OUTPATIENT
Start: 2024-01-28 | End: 2024-01-28

## 2024-01-28 RX ORDER — CEFTRIAXONE 500 MG/1
1000 INJECTION, POWDER, FOR SOLUTION INTRAMUSCULAR; INTRAVENOUS EVERY 24 HOURS
Refills: 0 | Status: DISCONTINUED | OUTPATIENT
Start: 2024-01-28 | End: 2024-01-28

## 2024-01-28 RX ORDER — LOSARTAN POTASSIUM 100 MG/1
25 TABLET, FILM COATED ORAL DAILY
Refills: 0 | Status: DISCONTINUED | OUTPATIENT
Start: 2024-01-28 | End: 2024-01-28

## 2024-01-28 RX ORDER — ATORVASTATIN CALCIUM 80 MG/1
1 TABLET, FILM COATED ORAL
Refills: 0 | DISCHARGE

## 2024-01-28 RX ORDER — SODIUM CHLORIDE 9 MG/ML
500 INJECTION INTRAMUSCULAR; INTRAVENOUS; SUBCUTANEOUS ONCE
Refills: 0 | Status: COMPLETED | OUTPATIENT
Start: 2024-01-28 | End: 2024-01-28

## 2024-01-28 RX ORDER — ACETAMINOPHEN 500 MG
650 TABLET ORAL EVERY 6 HOURS
Refills: 0 | Status: DISCONTINUED | OUTPATIENT
Start: 2024-01-28 | End: 2024-01-30

## 2024-01-28 RX ORDER — POTASSIUM CHLORIDE 20 MEQ
40 PACKET (EA) ORAL ONCE
Refills: 0 | Status: COMPLETED | OUTPATIENT
Start: 2024-01-28 | End: 2024-01-28

## 2024-01-28 RX ADMIN — ATORVASTATIN CALCIUM 20 MILLIGRAM(S): 80 TABLET, FILM COATED ORAL at 22:52

## 2024-01-28 RX ADMIN — Medication 650 MILLIGRAM(S): at 13:10

## 2024-01-28 RX ADMIN — CLOPIDOGREL BISULFATE 75 MILLIGRAM(S): 75 TABLET, FILM COATED ORAL at 10:06

## 2024-01-28 RX ADMIN — Medication 3 MILLIGRAM(S): at 22:52

## 2024-01-28 RX ADMIN — Medication 250 MILLIGRAM(S): at 01:47

## 2024-01-28 RX ADMIN — Medication 650 MILLIGRAM(S): at 22:53

## 2024-01-28 RX ADMIN — Medication 650 MILLIGRAM(S): at 23:30

## 2024-01-28 RX ADMIN — Medication 324 MILLIGRAM(S): at 02:36

## 2024-01-28 RX ADMIN — PIPERACILLIN AND TAZOBACTAM 200 GRAM(S): 4; .5 INJECTION, POWDER, LYOPHILIZED, FOR SOLUTION INTRAVENOUS at 00:52

## 2024-01-28 RX ADMIN — SODIUM CHLORIDE 1000 MILLILITER(S): 9 INJECTION INTRAMUSCULAR; INTRAVENOUS; SUBCUTANEOUS at 05:07

## 2024-01-28 RX ADMIN — HEPARIN SODIUM 5000 UNIT(S): 5000 INJECTION INTRAVENOUS; SUBCUTANEOUS at 22:54

## 2024-01-28 RX ADMIN — HEPARIN SODIUM 5000 UNIT(S): 5000 INJECTION INTRAVENOUS; SUBCUTANEOUS at 10:06

## 2024-01-28 RX ADMIN — SODIUM CHLORIDE 500 MILLILITER(S): 9 INJECTION INTRAMUSCULAR; INTRAVENOUS; SUBCUTANEOUS at 01:48

## 2024-01-28 RX ADMIN — Medication 650 MILLIGRAM(S): at 12:41

## 2024-01-28 RX ADMIN — CEFTRIAXONE 1000 MILLIGRAM(S): 500 INJECTION, POWDER, FOR SOLUTION INTRAMUSCULAR; INTRAVENOUS at 07:34

## 2024-01-28 RX ADMIN — Medication 40 MILLIEQUIVALENT(S): at 22:51

## 2024-01-28 NOTE — H&P ADULT - NSHPLABSRESULTS_GEN_ALL_CORE
< from: CT Abdomen and Pelvis No Cont (01.28.24 @ 00:52) >    ACC: [id number] EXAM:  CT ABDOMEN AND PELVIS   ORDERED BY: LEIDY ARGUETA     PROCEDURE DATE:  01/28/2024          INTERPRETATION:  CLINICAL INFORMATION: Evaluate for kidney stone    COMPARISON: None.    CONTRAST/COMPLICATIONS:  IV Contrast: None.  Oral Contrast: None.    PROCEDURE:  CT of the Abdomen and Pelvis was performed.  Sagittal and coronal reformats were performed.    FINDINGS:  LOWER CHEST: Coronary artery calcifications. Aortic valve calcifications.   Mitral annular calcifications. Trace pericardial effusion.    LIVER: Within normal limits.  BILE DUCTS: Normal caliber.  GALLBLADDER: Cholelithiasis.  SPLEEN: Within normal limits.  PANCREAS: Within normal limits.  ADRENALS: Within normal limits.  KIDNEYS/URETERS: 1.3 cm right renal cyst.    BLADDER: Minimally distended. Circumferential mural thickening of the   urinary bladder, likely related to chronic outlet obstruction.  REPRODUCTIVE ORGANS: Prostate is enlarged.    BOWEL: Extensive colonic diverticulosis without evidence of acute   diverticulitis. No bowel obstruction. Appendix is not visualized. No   evidence of inflammation in the pericecal region.  PERITONEUM: No ascites.  VESSELS: Atherosclerotic changes.  RETROPERITONEUM/LYMPH NODES: No lymphadenopathy.  ABDOMINAL WALL: Within normal limits.  BONES: No acute osseous abnormality.    IMPRESSION:  No acute findings in the abdomen or pelvis.    < end of copied text >

## 2024-01-28 NOTE — H&P ADULT - NSHPREVIEWOFSYSTEMS_GEN_ALL_CORE
REVIEW OF SYSTEMS:  CONSTITUTIONAL: +weakness, +fevers, + chills  EYES/ENT: No visual changes;  No vertigo or throat pain   NECK: No pain or stiffness  RESPIRATORY: No cough, wheezing, hemoptysis; No shortness of breath  CARDIOVASCULAR: No chest pain or palpitations  GASTROINTESTINAL: No abdominal or epigastric pain. No nausea, vomiting, or hematemesis; No diarrhea or constipation. No melena or hematochezia.  GENITOURINARY: + dysuria, + frequency, no hematuria  NEUROLOGICAL: No numbness or weakness  SKIN: No itching, rashes

## 2024-01-28 NOTE — ED ADULT NURSE REASSESSMENT NOTE - NS ED NURSE REASSESS COMMENT FT1
Report received from ED RN. Pt ordered breakfast for patient, ambulated safely to the bathroom with cane, medicated per MAR. Pt resting comfortably in bed with daughter at bedside

## 2024-01-28 NOTE — H&P ADULT - ASSESSMENT
Pt is a 86 yo M with PMHx of CAD s/p ROCAEL in 2019, hypertensive cardiomyopathy, HTN BIB daughter for approx 1 week of dysuria, urinary incontinence, started to have also fever,  chills, and generalized weakness over the last 2 days. Per daughter pt has had multiple UTIs and  is supposed to have a procedure in his penis to avoid infections but says has not scheduled bc is non urgernt. Pt denies cp, sob, ha.       #Sepsis 2/2 Complicated UTI   -SIRS 3/4 on arrival with source of infection   -s/p Zosyn and Vancomycin in ED   -begin Ceftriaxone 1 gm q24   -only received 750 cc IVFs in ED 2/2 concern for volume overload  -previous in echo in July with paul EF, mod AS, no signs of overt HF on exam  -will order additional Liter  as pt's BP soft, check a bnp, hold antihypertensives  -fu cultures  -Tylenol prn for fevers  -monitor VS     #Elevated Troponin   -likely 2/2 demand ischemia in setting of sepsis  -no signs or symptos of ACS   -monitor on telemetry  -TTE (last in july 2023)   -cardiology consult     #CAD   -s/p ROCAEL in 2019  -unsure why on DAPT, pt follows at [facility] with Dr Grover. Will continue, cardiology to evaluate.   -c/w statin, metoprolol on hold    #Chronic Anemia  -monitor cbc   -prior Hx of GIB, denies bleeding at this time    #HTN   -will hold antihypertensive at this time in setting of sepsis and soft bp   -DASH diet     #CKD3  #Proteinuria   -avoid nephrotoxic medications   -check urine p/c ratio     #DVT PPx   -Heparin subQ   Pt is a 88 yo M with PMHx of CAD s/p ROCAEL in 2019, hypertensive cardiomyopathy, HTN BIB daughter for approx 1 week of dysuria, urinary incontinence, started to have also fever,  chills, and generalized weakness over the last 2 days. Per daughter pt has had multiple UTIs and  is supposed to have a procedure in his penis to avoid infections but says has not scheduled bc is non urgernt. Pt denies cp, sob, ha.       #Sepsis 2/2 Complicated UTI   -SIRS 3/4 on arrival with source of infection   -s/p Zosyn and Vancomycin in ED   -begin Ceftriaxone 1 gm q24   -only received 750 cc IVFs in ED 2/2 concern for volume overload  -previous in echo in July with paul EF, mod AS, no signs of overt HF on exam  -will order additional Liter  as pt's BP soft, check a bnp, hold antihypertensives  -fu cultures  -Tylenol prn for fevers  -monitor VS   -per daughter has frequent UTIs, will order urology consult    #Elevated Troponin   -likely 2/2 demand ischemia in setting of sepsis  -no signs or symptoms of ACS   -monitor on telemetry  -obtain TTE (last in july 2023)   -cardiology consult     #CAD   -s/p ROCAEL in 2019  -unsure why on DAPT, pt follows at [facility] with Dr Grover. Will continue, cardiology to evaluate.   -c/w statin, metoprolol on hold    #Chronic Anemia  -monitor cbc   -prior Hx of GIB, denies bleeding at this time    #HTN   -will hold antihypertensive at this time in setting of sepsis and soft bp   -DASH diet     #CKD3  #Proteinuria   -avoid nephrotoxic medications   -check urine p/c ratio     #DVT PPx   -Heparin subQ

## 2024-01-28 NOTE — H&P ADULT - HISTORY OF PRESENT ILLNESS
Pt is a 86 yo M with PMHx of CAD s/p ROCAEL in 2019, hypertensive cardiomyopathy, HTN BIB daughter for approx 1 week of dysuria, urinary incontinence, started to have also fever,  chills, and generalized weakness over the last 2 days. Per daughter pt has had multiple UTIs and  is supposed to have a procedure in his penis to avoid infections but says has not scheduled bc is non urgernt. Pt denies cp, sob, ha.       In ED VS on arrival: T 100.7, , /65, SpO2 98% on RA. EKG with sinus tachycradia, no ischemic changes. CXR: no pneumonic process. Received Zosyn and Vancomycin.

## 2024-01-28 NOTE — PROGRESS NOTE ADULT - SUBJECTIVE AND OBJECTIVE BOX
CC: Pt is a 86 yo M with PMHx of CAD s/p ROCAEL in 2019, hypertensive cardiomyopathy, HTN BIB daughter for approx 1 week of dysuria, urinary incontinence, started to have also fever,  chills, and generalized weakness over the last 2 days. Per daughter pt has had multiple UTIs and  is supposed to have a procedure in his penis to avoid infections but says has not scheduled bc is non urgernt. Pt denies cp, sob, ha.     1/28 -  [id number] cancelled as pt prefers his wife translate. no cp palps sob abdo pain, feels weak    Vital Signs Last 24 Hrs  T(C): 36.6 (28 Jan 2024 10:00), Max: 38.2 (27 Jan 2024 22:18)  T(F): 97.9 (28 Jan 2024 10:00), Max: 100.7 (27 Jan 2024 22:18)  HR: 89 (28 Jan 2024 10:00) (80 - 107)  BP: 120/44 (28 Jan 2024 10:00) (99/69 - 130/65)  BP(mean): 61 (28 Jan 2024 04:30) (61 - 78)  RR: 16 (28 Jan 2024 10:00) (16 - 23)  SpO2: 98% (28 Jan 2024 10:00) (95% - 99%)  Constitutional: NAD, awake and alert  HEENT: PERR, EOMI  Neck: Soft and supple,  No JVD  Respiratory: Breath sounds are clear bilaterally, No wheezing, rales or rhonchi  Cardiovascular: S1 and S2, regular rate and rhythm, no Murmurs  Gastrointestinal: Bowel Sounds present, soft, nontender, nondistended  Extremities: No peripheral edema  Vascular: 2+ peripheral pulses  Neurological: A/O x 3, no focal deficits  Musculoskeletal: 5/5 strength b/l upper and lower extremities  Skin: No rashes    MEDICATIONS:  MEDICATIONS  (STANDING):  aspirin  chewable 81 milliGRAM(s) Oral daily  atorvastatin 20 milliGRAM(s) Oral at bedtime  cefTRIAXone Injectable. 1000 milliGRAM(s) IV Push every 24 hours  clopidogrel Tablet 75 milliGRAM(s) Oral daily  heparin   Injectable 5000 Unit(s) SubCutaneous every 12 hours      LABS: All Labs Reviewed:                     9.6    12.90 )-----------( 173      ( 28 Jan 2024 04:51 )             30.3   138  |  107  |  19  ----------------------------<  162<H>  3.4<L>   |  28  |  1.34<H>  Ca    8.2<L>      28 Jan 2024 04:51  TPro  6.8  /  Alb  3.0<L>  /  TBili  0.4  /  DBili  x   /  AST  10<L>  /  ALT  16  /  AlkPhos  75  01-27  PT/INR - ( 27 Jan 2024 23:45 )   PT: 13.7 sec;   INR: 1.22 ratio    PTT - ( 27 Jan 2024 23:45 )  PTT:38.9 sec      RADIOLOGY/EKG:  < from: CT Abdomen and Pelvis No Cont (01.28.24 @ 00:52) >  No acute findings in the abdomen or pelvis.    < from: CT Head No Cont (01.28.24 @ 00:51) >  No large territory acute infarct, intracranial hemorrhage, or mass effect.    < from: TTE Echo Complete w/o Contrast w/ Doppler (07.18.23 @ 08:57) >  Estimated left ventricular ejection fraction is 55-60 %.   The left atrium is mildly dilated.   Normal appearing right atrium.   Normal appearing right ventricle structure.   The aortic valve is trileaflet.   The aortic valve is visualized, appears moderately calcified. Valve   opening seems to be restricted.   Moderate aortic stenosis is present.   The mitral valve leaflets appear thickened.   Moderate mitral annular calcification is present.   Moderate mitral stenosis is present.   Trace tricuspid valve regurgitation is present.   Trace tricuspid valve regurgitation is present.   Normal appearing pulmonic valve structure and function.

## 2024-01-28 NOTE — H&P ADULT - NSHPPHYSICALEXAM_GEN_ALL_CORE
Vital Signs Last 24 Hrs  T(C): 38.2 (27 Jan 2024 22:18), Max: 38.2 (27 Jan 2024 22:18)  T(F): 100.7 (27 Jan 2024 22:18), Max: 100.7 (27 Jan 2024 22:18)  HR: 81 (28 Jan 2024 01:30) (81 - 107)  BP: 120/67 (28 Jan 2024 01:30) (99/69 - 130/65)  BP(mean): 78 (27 Jan 2024 22:18) (78 - 78)  RR: 18 (28 Jan 2024 01:30) (18 - 23)  SpO2: 98% (28 Jan 2024 01:30) (95% - 98%)    Parameters below as of 28 Jan 2024 01:30  Patient On (Oxygen Delivery Method): room air        General: WN/WD NAD  Head- Atraumatic, normocephalic   Neurology: A&Ox3, nonfocal, CN II to XII intact  HEENT- PERRLA, moist muscous membrane  Neck-supple, no JVD  Respiratory: Air entry equal b/l, CTA   CVS:  S1S2, RRR. Systolic murmur RUSB and LLSB   Abdominal: Soft, NT, ND +BS,   Extremities: No edema, + peripheral pulses  Skin- no rash, no ulcer  Psychiatric- mood stable

## 2024-01-29 DIAGNOSIS — I10 ESSENTIAL (PRIMARY) HYPERTENSION: ICD-10-CM

## 2024-01-29 DIAGNOSIS — N39.0 URINARY TRACT INFECTION, SITE NOT SPECIFIED: ICD-10-CM

## 2024-01-29 DIAGNOSIS — N18.30 CHRONIC KIDNEY DISEASE, STAGE 3 UNSPECIFIED: ICD-10-CM

## 2024-01-29 DIAGNOSIS — R79.89 OTHER SPECIFIED ABNORMAL FINDINGS OF BLOOD CHEMISTRY: ICD-10-CM

## 2024-01-29 DIAGNOSIS — N40.1 BENIGN PROSTATIC HYPERPLASIA WITH LOWER URINARY TRACT SYMPTOMS: ICD-10-CM

## 2024-01-29 DIAGNOSIS — D64.9 ANEMIA, UNSPECIFIED: ICD-10-CM

## 2024-01-29 DIAGNOSIS — I25.10 ATHEROSCLEROTIC HEART DISEASE OF NATIVE CORONARY ARTERY WITHOUT ANGINA PECTORIS: ICD-10-CM

## 2024-01-29 DIAGNOSIS — A41.9 SEPSIS, UNSPECIFIED ORGANISM: ICD-10-CM

## 2024-01-29 LAB
ANION GAP SERPL CALC-SCNC: 2 MMOL/L — LOW (ref 5–17)
BUN SERPL-MCNC: 17 MG/DL — SIGNIFICANT CHANGE UP (ref 7–23)
CALCIUM SERPL-MCNC: 8.4 MG/DL — LOW (ref 8.5–10.1)
CHLORIDE SERPL-SCNC: 113 MMOL/L — HIGH (ref 96–108)
CO2 SERPL-SCNC: 27 MMOL/L — SIGNIFICANT CHANGE UP (ref 22–31)
CREAT ?TM UR-MCNC: 133 MG/DL — SIGNIFICANT CHANGE UP
CREAT SERPL-MCNC: 0.98 MG/DL — SIGNIFICANT CHANGE UP (ref 0.5–1.3)
EGFR: 75 ML/MIN/1.73M2 — SIGNIFICANT CHANGE UP
GLUCOSE SERPL-MCNC: 118 MG/DL — HIGH (ref 70–99)
HCT VFR BLD CALC: 31.9 % — LOW (ref 39–50)
HGB BLD-MCNC: 10 G/DL — LOW (ref 13–17)
MAGNESIUM SERPL-MCNC: 2.3 MG/DL — SIGNIFICANT CHANGE UP (ref 1.6–2.6)
MCHC RBC-ENTMCNC: 26.5 PG — LOW (ref 27–34)
MCHC RBC-ENTMCNC: 31.3 GM/DL — LOW (ref 32–36)
MCV RBC AUTO: 84.4 FL — SIGNIFICANT CHANGE UP (ref 80–100)
PLATELET # BLD AUTO: 201 K/UL — SIGNIFICANT CHANGE UP (ref 150–400)
POTASSIUM SERPL-MCNC: 4.1 MMOL/L — SIGNIFICANT CHANGE UP (ref 3.5–5.3)
POTASSIUM SERPL-SCNC: 4.1 MMOL/L — SIGNIFICANT CHANGE UP (ref 3.5–5.3)
PROT ?TM UR-MCNC: 45 MG/DL — HIGH (ref 0–12)
PROT/CREAT UR-RTO: 0.3 RATIO — HIGH (ref 0–0.2)
RBC # BLD: 3.78 M/UL — LOW (ref 4.2–5.8)
RBC # FLD: 16.5 % — HIGH (ref 10.3–14.5)
SODIUM SERPL-SCNC: 142 MMOL/L — SIGNIFICANT CHANGE UP (ref 135–145)
WBC # BLD: 10.48 K/UL — SIGNIFICANT CHANGE UP (ref 3.8–10.5)
WBC # FLD AUTO: 10.48 K/UL — SIGNIFICANT CHANGE UP (ref 3.8–10.5)

## 2024-01-29 PROCEDURE — 99232 SBSQ HOSP IP/OBS MODERATE 35: CPT

## 2024-01-29 PROCEDURE — 93306 TTE W/DOPPLER COMPLETE: CPT | Mod: 26

## 2024-01-29 PROCEDURE — 99222 1ST HOSP IP/OBS MODERATE 55: CPT

## 2024-01-29 RX ORDER — TAMSULOSIN HYDROCHLORIDE 0.4 MG/1
0.4 CAPSULE ORAL AT BEDTIME
Refills: 0 | Status: DISCONTINUED | OUTPATIENT
Start: 2024-01-29 | End: 2024-01-30

## 2024-01-29 RX ADMIN — Medication 81 MILLIGRAM(S): at 11:09

## 2024-01-29 RX ADMIN — HEPARIN SODIUM 5000 UNIT(S): 5000 INJECTION INTRAVENOUS; SUBCUTANEOUS at 21:33

## 2024-01-29 RX ADMIN — CEFTRIAXONE 1000 MILLIGRAM(S): 500 INJECTION, POWDER, FOR SOLUTION INTRAMUSCULAR; INTRAVENOUS at 06:00

## 2024-01-29 RX ADMIN — ATORVASTATIN CALCIUM 20 MILLIGRAM(S): 80 TABLET, FILM COATED ORAL at 21:33

## 2024-01-29 RX ADMIN — CLOPIDOGREL BISULFATE 75 MILLIGRAM(S): 75 TABLET, FILM COATED ORAL at 11:09

## 2024-01-29 RX ADMIN — TAMSULOSIN HYDROCHLORIDE 0.4 MILLIGRAM(S): 0.4 CAPSULE ORAL at 21:34

## 2024-01-29 RX ADMIN — Medication 3 MILLIGRAM(S): at 21:33

## 2024-01-29 RX ADMIN — HEPARIN SODIUM 5000 UNIT(S): 5000 INJECTION INTRAVENOUS; SUBCUTANEOUS at 11:08

## 2024-01-29 NOTE — CONSULT NOTE ADULT - ASSESSMENT
88 yo male with PMH as above admitted for UTI. Has hx of urethral strictures and recurrent UTIs.  Recommend  - Continue antibiotics, adjust as per cultures/ sensitivities    - Check post void residual, if significant place mckinnon  - Flomax  - Follow up with Dr. Graves outpatient for further work up/ cystoscopy     Case discussed with Dr. Graves 
Pt is a 88 yo M with PMHx of CAD s/p ROCAEL in 2019, hypertensive cardiomyopathy, HTN BIB daughter for approx 1 week of dysuria, urinary incontinence, started to have also fever,  chills, and generalized weakness over the last 2 days. Consulted for elevated Troponin levels although he has no chest pain or shortness of breath.    1/28/24  At this time, treatment for the urine infection.   No cardiac interventions needed at this time.

## 2024-01-29 NOTE — PROGRESS NOTE ADULT - SUBJECTIVE AND OBJECTIVE BOX
86 yo M with PMHx of CAD s/p ROCAEL in 2019, hypertensive cardiomyopathy, HTN BIB daughter for approx 1 week of dysuria, urinary incontinence, started to have also fever,  chills, and generalized weakness over the last 2 days.    1/29/24    Blood cx: no growth  ur. cx: pending   86 yo M with PMHx of CAD s/p ROCAEL in 2019, hypertensive cardiomyopathy, HTN BIB daughter for approx 1 week of dysuria, urinary incontinence, started to have also fever,  chills, and generalized weakness over the last 2 days.    1/29/24  Patient seen and examined at bedside. No cp, no sob, no n/v.    Vital Signs Last 24 Hrs  T(C): 36.4 (29 Jan 2024 20:13), Max: 37.1 (28 Jan 2024 21:47)  T(F): 97.5 (29 Jan 2024 20:13), Max: 98.8 (28 Jan 2024 21:47)  HR: 82 (29 Jan 2024 20:13) (82 - 91)  BP: 125/58 (29 Jan 2024 20:13) (125/58 - 140/53)  BP(mean): --  RR: 17 (29 Jan 2024 20:13) (17 - 17)  SpO2: 99% (29 Jan 2024 20:13) (96% - 99%)    Parameters below as of 29 Jan 2024 20:13  Patient On (Oxygen Delivery Method): room air        Blood cx: no growth  ur. cx: pending   88 yo M with PMHx of CAD s/p ROCAEL in 2019, hypertensive cardiomyopathy, HTN BIB daughter for approx 1 week of dysuria, urinary incontinence, started to have also fever,  chills, and generalized weakness over the last 2 days.    1/29/24  Patient seen and examined at bedside. No cp, no sob, no n/v.    Vital Signs Last 24 Hrs  T(C): 36.4 (29 Jan 2024 20:13), Max: 37.1 (28 Jan 2024 21:47)  T(F): 97.5 (29 Jan 2024 20:13), Max: 98.8 (28 Jan 2024 21:47)  HR: 82 (29 Jan 2024 20:13) (82 - 91)  BP: 125/58 (29 Jan 2024 20:13) (125/58 - 140/53)  BP(mean): --  RR: 17 (29 Jan 2024 20:13) (17 - 17)  SpO2: 99% (29 Jan 2024 20:13) (96% - 99%)    Parameters below as of 29 Jan 2024 20:13  Patient On (Oxygen Delivery Method): room air        Blood cx: no growth      Urine Culture Results:   >100,000 CFU/ml Gram Negative Rods (01.27.24 @ 23:45)

## 2024-01-29 NOTE — PROGRESS NOTE ADULT - SUBJECTIVE AND OBJECTIVE BOX
Admitted with UTI.  Noted to have sinus tachycardia and elevated trop-I. No chest pain    Remains hemodynamically stable. No chest pain.  Dysuria improving.     PAST MEDICAL HX  CAD coronary artery disease  Elevated blood glucose  Hypertensive Cardiomyopathy   HTN (hypertension).   Aortic Stenosis    PAST SURGICAL HX  Cardiac cath  12/20 with ROCAEL to PRDA  History of colonoscopy.        PREVIOUS CARDIAC WORKUP:      Echo: 7/12/23  --The interventricular septum is moderately hypertrophied.  --LV global wall motion is normal.  --LV ejection fraction (55 %) is normal.  --There is mild aortic valve thickening. The aortic valve is moderately calcified. There is moderate aortic stenosis. The estimated aortic valve area is 1.00 cm². The aortic valve has a peak gradient is 47mmHg with a mean of 36mmHg. There is trace aortic regurgitation.  --There is mitral annular calcification. There is mild mitral valve thickening. There is mild mitral stenosis. There is trace mitral regurgitation. Mitral valve area is 2.3cm2.  --There is mild tricuspid regurgitation.  --There is mild pulmonic regurgitation.  --There is mild pulmonary hypertension. Estiamted PASP 37 mm Hg  --There is a trace pericardial effusion.    Allergies:   No Known Allergies      MEDICATIONS  (STANDING):  aspirin  chewable 81 milliGRAM(s) Oral daily  atorvastatin 20 milliGRAM(s) Oral at bedtime  cefTRIAXone Injectable. 1000 milliGRAM(s) IV Push every 24 hours  clopidogrel Tablet 75 milliGRAM(s) Oral daily  heparin   Injectable 5000 Unit(s) SubCutaneous every 12 hours    MEDICATIONS  (PRN):  acetaminophen     Tablet .. 650 milliGRAM(s) Oral every 6 hours PRN Temp greater or equal to 38C (100.4F), Mild Pain (1 - 3)  aluminum hydroxide/magnesium hydroxide/simethicone Suspension 30 milliLiter(s) Oral every 4 hours PRN Dyspepsia  melatonin 3 milliGRAM(s) Oral at bedtime PRN Insomnia  ondansetron Injectable 4 milliGRAM(s) IV Push every 8 hours PRN Nausea and/or Vomiting      Vital Signs Last 24 Hrs  T(C): 36.6 (29 Jan 2024 07:37), Max: 37.1 (28 Jan 2024 21:47)  T(F): 97.8 (29 Jan 2024 07:37), Max: 98.8 (28 Jan 2024 21:47)  HR: 85 (29 Jan 2024 07:37) (85 - 91)  BP: 133/55 (29 Jan 2024 07:37) (120/44 - 140/53)  RR: 17 (29 Jan 2024 07:37) (16 - 17)  SpO2: 96% (29 Jan 2024 07:37) (96% - 99%)    Parameters below as of 29 Jan 2024 07:37  Patient On (Oxygen Delivery Method): room air        I&O's Summary      PHYSICAL EXAM:    .phy      TELEMETRY:    ECG:    LABS:                        10.0   10.48 )-----------( 201      ( 29 Jan 2024 06:42 )             31.9     01-29    142  |  113<H>  |  17  ----------------------------<  118<H>  4.1   |  27  |  0.98    Ca    8.4<L>      29 Jan 2024 06:42  Mg     2.3     01-29    TPro  6.8  /  Alb  3.0<L>  /  TBili  0.4  /  DBili  x   /  AST  10<L>  /  ALT  16  /  AlkPhos  75  01-27 01-28 @ 04:51  Trop-I 448.94    01-28 @ 02:00  Trop-I 547.39    01-27 @ 23:45  Trop-I 664.55      Pro BNP   01-28 @ 04:51  801    PT/INR - ( 27 Jan 2024 23:45 )   PT: 13.7 sec;   INR: 1.22 ratio    PTT - ( 27 Jan 2024 23:45 )  PTT:38.9 sec      RADIOLOGY & ADDITIONAL STUDIES:

## 2024-01-29 NOTE — PHYSICAL THERAPY INITIAL EVALUATION ADULT - PERTINENT HX OF CURRENT PROBLEM, REHAB EVAL
88 yo M with PMHx of CAD s/p ROCAEL in 2019, hypertensive cardiomyopathy, HTN BIB daughter for approx 1 week of dysuria, urinary incontinence, started to have also fever,  chills, and generalized weakness over the last 2 days. Per daughter pt has had multiple UTIs and  is supposed to have a procedure in his penis to avoid infections but says has not scheduled bc is non urgernt. Pt denies cp, sob, ha.

## 2024-01-29 NOTE — CONSULT NOTE ADULT - SUBJECTIVE AND OBJECTIVE BOX
Pt is a 86 yo M with PMHx of CAD s/p ROCAEL in 2019, hypertensive cardiomyopathy, HTN BIB daughter for approx 1 week of dysuria, urinary incontinence, started to have also fever,  chills, and generalized weakness over the last 2 days. Per daughter pt has had multiple UTIs and  is supposed to have a procedure in his penis to avoid infections but says has not scheduled bc is non urgernt. Pt denies cp, sob, ha.   In ED VS on arrival: T 100.7, , /65, SpO2 98% on RA. EKG with sinus tachycradia, no ischemic changes. CXR: no pneumonic process. Received Zosyn and Vancomycin.     1/28/24  he is feeling better since being admitted with a UTI. No more fever.   denies any chest pain or shortness of breath.   the Troponins were initially elevated but downtrending.   No chest pain or shortness of breath.     CARDIAC STUDIES;  ECHO: 7/18/2023  Estimated left ventricular ejection fraction is 55-60 %.   The left atrium is mildly dilated.   Normal appearing right atrium.   Normal appearing right ventricle structure.   The aortic valve is trileaflet.   The aortic valve is visualized, appears moderately calcified. Valve   opening seems to be restricted.   Moderate aortic stenosis is present.   The mitral valve leaflets appear thickened.   Moderate mitral annular calcification is present.   Moderate mitral stenosis is present.   Trace tricuspid valve regurgitation is present.   Trace tricuspid valve regurgitation is present.   Normal appearing pulmonic valve structure and function.    CARDIAC CATH: 12/20/19  Angiographic Findings     Cardiac Arteries and Lesion Findings    LMCA: Minor irregularities.    LAD: Large caliber vessel. Mild diffuse disease.    LCx: Large caliber vessel. Minor irregularities.    RCA: Large caliber vessel. Mild diffuse disease.     R PDA: Ostial.95% stenosis reduced to 1%.Pre procedure HITESH III flow was   noted. The lesion was diagnosed as a moderate risk lesion.The lesion was   discrete.     Post Procedure HITESH III flow was present.     Treatment results:Interventional treatment was successful.     Comments:Distal vessel small sized, minor irregularities.        PAST MEDICAL & SURGICAL HISTORY:  Cardiomyopathy      HTN (hypertension)      History of colonoscopy        MEDICATIONS  (STANDING):  aspirin  chewable 81 milliGRAM(s) Oral daily  atorvastatin 20 milliGRAM(s) Oral at bedtime  cefTRIAXone Injectable. 1000 milliGRAM(s) IV Push every 24 hours  clopidogrel Tablet 75 milliGRAM(s) Oral daily  heparin   Injectable 5000 Unit(s) SubCutaneous every 12 hours    MEDICATIONS  (PRN):  acetaminophen     Tablet .. 650 milliGRAM(s) Oral every 6 hours PRN Temp greater or equal to 38C (100.4F), Mild Pain (1 - 3)  aluminum hydroxide/magnesium hydroxide/simethicone Suspension 30 milliLiter(s) Oral every 4 hours PRN Dyspepsia  melatonin 3 milliGRAM(s) Oral at bedtime PRN Insomnia  ondansetron Injectable 4 milliGRAM(s) IV Push every 8 hours PRN Nausea and/or Vomiting    Allergies    No Known Allergies    Intolerances      FAMILY HISTORY:        REVIEW OF SYSTEMS:    CONSTITUTIONAL: No weakness, fevers or chills  EYES/ENT: No visual changes;  No vertigo or throat pain   NECK: No pain or stiffness  RESPIRATORY: No cough, wheezing, hemoptysis; No shortness of breath  CARDIOVASCULAR: No chest pain or palpitations  GASTROINTESTINAL: No abdominal or epigastric pain. No nausea, vomiting, or hematemesis; No diarrhea or constipation. No melena or hematochezia.  GENITOURINARY: No dysuria, frequency or hematuria  NEUROLOGICAL: No numbness or weakness  SKIN: No itching, burning, rashes, or lesions   All other review of systems is negative unless indicated above      PHYSICAL EXAM:  Daily Height in cm: 162.56 (27 Jan 2024 21:53)    Daily   Vital Signs Last 24 Hrs  T(C): 36.6 (28 Jan 2024 10:00), Max: 38.2 (27 Jan 2024 22:18)  T(F): 97.9 (28 Jan 2024 10:00), Max: 100.7 (27 Jan 2024 22:18)  HR: 89 (28 Jan 2024 10:00) (80 - 107)  BP: 120/44 (28 Jan 2024 10:00) (99/69 - 130/65)  BP(mean): 61 (28 Jan 2024 04:30) (61 - 78)  RR: 16 (28 Jan 2024 10:00) (16 - 23)  SpO2: 98% (28 Jan 2024 10:00) (95% - 99%)    Parameters below as of 28 Jan 2024 10:00  Patient On (Oxygen Delivery Method): room air        Constitutional: NAD, awake and alert, well-developed  HEENT: PERR, EOMI, Normal Hearing, MMM  Neck: Soft and supple, No LAD, No JVD  Respiratory: Breath sounds are clear bilaterally, No wheezing, rales or rhonchi. Clear Lungs  Cardiovascular: S1 and S2, regular rate and rhythm, no Murmurs, gallops or rubs  Gastrointestinal: Bowel Sounds present, soft, nontender, nondistended, no guarding, no rebound  Extremities: No peripheral edema  Vascular: 2+ peripheral pulses  Neurological: A/O x 3, no focal deficits  Musculoskeletal: 5/5 strength b/l upper and lower extremities  Skin: No rashes    LABS: All Labs Reviewed:                        9.6    12.90 )-----------( 173      ( 28 Jan 2024 04:51 )             30.3     01-28    138  |  107  |  19  ----------------------------<  162<H>  3.4<L>   |  28  |  1.34<H>    Ca    8.2<L>      28 Jan 2024 04:51    TPro  6.8  /  Alb  3.0<L>  /  TBili  0.4  /  DBili  x   /  AST  10<L>  /  ALT  16  /  AlkPhos  75  01-27    PT/INR - ( 27 Jan 2024 23:45 )   PT: 13.7 sec;   INR: 1.22 ratio         PTT - ( 27 Jan 2024 23:45 )  PTT:38.9 sec      Blood Culture:     RADIOLOGY: < from: CT Abdomen and Pelvis No Cont (01.28.24 @ 00:52) >  ACC: [id number] EXAM:  CT ABDOMEN AND PELVIS   ORDERED BY: LEIDY ARGUETA     PROCEDURE DATE:  01/28/2024          INTERPRETATION:  CLINICAL INFORMATION: Evaluate for kidney stone    COMPARISON: None.    CONTRAST/COMPLICATIONS:  IV Contrast: None.  Oral Contrast: None.    PROCEDURE:  CT of the Abdomen and Pelvis was performed.  Sagittal and coronal reformats were performed.    FINDINGS:  LOWER CHEST: Coronary artery calcifications. Aortic valve calcifications.   Mitral annular calcifications. Trace pericardial effusion.    LIVER: Within normal limits.  BILE DUCTS: Normal caliber.  GALLBLADDER: Cholelithiasis.  SPLEEN: Within normal limits.  PANCREAS: Within normal limits.  ADRENALS: Within normal limits.  KIDNEYS/URETERS: 1.3 cm right renal cyst.    BLADDER: Minimally distended. Circumferential mural thickening of the   urinary bladder, likely related to chronic outlet obstruction.  REPRODUCTIVE ORGANS: Prostate is enlarged.    BOWEL: Extensive colonic diverticulosis without evidence of acute   diverticulitis. No bowel obstruction. Appendix is not visualized. No   evidence of inflammation in the pericecal region.  PERITONEUM: No ascites.  VESSELS: Atherosclerotic changes.  RETROPERITONEUM/LYMPH NODES: No lymphadenopathy.  ABDOMINAL WALL: Within normal limits.  BONES: No acute osseous abnormality.    IMPRESSION:  No acute findings in the abdomen or pelvis.    < end of copied text >    
HPI:  Pt is a 86 yo M with PMHx of CAD s/p ROCAEL in 2019, hypertensive cardiomyopathy, HTN BIB daughter for approx 1 week of dysuria, urinary incontinence, started to have also fever,  chills, and generalized weakness over the last 2 days. Per daughter pt has had multiple UTIs and  is supposed to have a procedure in his penis to avoid infections but says has not scheduled bc is non urgernt. Pt denies cp, sob, ha.       In ED VS on arrival: T 100.7, , /65, SpO2 98% on RA. EKG with sinus tachycradia, no ischemic changes. CXR: no pneumonic process. Received Zosyn and Vancomycin.    (28 Jan 2024 04:39)      86 yo male with PMH as above admitted for UTI. Pt seen at bedside with his daughter at bedside. Offered  services but patient declined and asked his daughter to interpret. Pt and his daughter report he was following up with Dr. Gomez and was told her has urethral strictures that may need dilation in the OR but they never scheduled it. Patient denies being on any medication for BPH at this time. He also reports he gets one UTI a year. He denies any fevers, chills, abd/flank pain, urinary retention. Pt and her daughter report they no longer want to see Dr. Gomez.    PAST MEDICAL & SURGICAL HISTORY:  Cardiomyopathy      HTN (hypertension)  All other review of systems neg, except as noted in HPI    History of colonoscopy          REVIEW OF SYSTEMS         MEDICATIONS  (STANDING):  aspirin  chewable 81 milliGRAM(s) Oral daily  atorvastatin 20 milliGRAM(s) Oral at bedtime  cefTRIAXone Injectable. 1000 milliGRAM(s) IV Push every 24 hours  clopidogrel Tablet 75 milliGRAM(s) Oral daily  heparin   Injectable 5000 Unit(s) SubCutaneous every 12 hours    MEDICATIONS  (PRN):  acetaminophen     Tablet .. 650 milliGRAM(s) Oral every 6 hours PRN Temp greater or equal to 38C (100.4F), Mild Pain (1 - 3)  aluminum hydroxide/magnesium hydroxide/simethicone Suspension 30 milliLiter(s) Oral every 4 hours PRN Dyspepsia  melatonin 3 milliGRAM(s) Oral at bedtime PRN Insomnia  ondansetron Injectable 4 milliGRAM(s) IV Push every 8 hours PRN Nausea and/or Vomiting      Allergies    No Known Allergies    Intolerances        SOCIAL HISTORY:    FAMILY HISTORY:      Vital Signs Last 24 Hrs  T(C): 36.6 (29 Jan 2024 07:37), Max: 37.1 (28 Jan 2024 21:47)  T(F): 97.8 (29 Jan 2024 07:37), Max: 98.8 (28 Jan 2024 21:47)  HR: 85 (29 Jan 2024 07:37) (85 - 91)  BP: 133/55 (29 Jan 2024 07:37) (133/55 - 140/53)  BP(mean): --  RR: 17 (29 Jan 2024 07:37) (17 - 17)  SpO2: 96% (29 Jan 2024 07:37) (96% - 99%)    Parameters below as of 29 Jan 2024 07:37  Patient On (Oxygen Delivery Method): room air        PHYSICAL EXAM:       General: No distress, No anxiety  VITALS  T(C): 36.6 (01-29-24 @ 07:37), Max: 37.1 (01-28-24 @ 21:47)  HR: 85 (01-29-24 @ 07:37) (85 - 91)  BP: 133/55 (01-29-24 @ 07:37) (133/55 - 140/53)  RR: 17 (01-29-24 @ 07:37) (17 - 17)  SpO2: 96% (01-29-24 @ 07:37) (96% - 99%)            Skin     : No jaundice   HEENT: Normocephalic, no icterus , EOM full , No epistaxis  Lung    : No resp distress  Abdo:   : Soft, Non tender, No guarding, No distension   Back    : No CVAT b/l  Genitalia Male: No Foleyy  Neuro   : A&Ox3        LABS:                        10.0   10.48 )-----------( 201      ( 29 Jan 2024 06:42 )             31.9     01-29    142  |  113<H>  |  17  ----------------------------<  118<H>  4.1   |  27  |  0.98    Ca    8.4<L>      29 Jan 2024 06:42  Mg     2.3     01-29    TPro  6.8  /  Alb  3.0<L>  /  TBili  0.4  /  DBili  x   /  AST  10<L>  /  ALT  16  /  AlkPhos  75  01-27    PT/INR - ( 27 Jan 2024 23:45 )   PT: 13.7 sec;   INR: 1.22 ratio         PTT - ( 27 Jan 2024 23:45 )  PTT:38.9 sec  Urinalysis Basic - ( 29 Jan 2024 06:42 )    Color: x / Appearance: x / SG: x / pH: x  Gluc: 118 mg/dL / Ketone: x  / Bili: x / Urobili: x   Blood: x / Protein: x / Nitrite: x   Leuk Esterase: x / RBC: x / WBC x   Sq Epi: x / Non Sq Epi: x / Bacteria: x        RADIOLOGY & ADDITIONAL STUDIES:< from: CT Abdomen and Pelvis No Cont (01.28.24 @ 00:52) >    ACC: [id number] EXAM:  CT ABDOMEN AND PELVIS   ORDERED BY: LEIDY ARGUETA     PROCEDURE DATE:  01/28/2024          INTERPRETATION:  CLINICAL INFORMATION: Evaluate for kidney stone    COMPARISON: None.    CONTRAST/COMPLICATIONS:  IV Contrast: None.  Oral Contrast: None.    PROCEDURE:  CT of the Abdomen and Pelvis was performed.  Sagittal and coronal reformats were performed.    FINDINGS:  LOWER CHEST: Coronary artery calcifications. Aortic valve calcifications.   Mitral annular calcifications. Trace pericardial effusion.    LIVER: Within normal limits.  BILE DUCTS: Normal caliber.  GALLBLADDER: Cholelithiasis.  SPLEEN: Within normal limits.  PANCREAS: Within normal limits.  ADRENALS: Within normal limits.  KIDNEYS/URETERS: 1.3 cm right renal cyst.    BLADDER: Minimally distended. Circumferential mural thickening of the   urinary bladder, likely related to chronic outlet obstruction.  REPRODUCTIVE ORGANS: Prostate is enlarged.    BOWEL: Extensive colonic diverticulosis without evidence of acute   diverticulitis. No bowel obstruction. Appendix is not visualized. No   evidence of inflammation in the pericecal region.  PERITONEUM: No ascites.  VESSELS: Atherosclerotic changes.  RETROPERITONEUM/LYMPH NODES: No lymphadenopathy.  ABDOMINAL WALL: Within normal limits.  BONES: No acute osseous abnormality.    IMPRESSION:  No acute findings in the abdomen or pelvis.        --- End of Report ---            JOSEFINA SADLER MD; Attending Radiologist  This document has been electronically signed.Jan 28 2024  1:33AM    < end of copied text >

## 2024-01-30 ENCOUNTER — TRANSCRIPTION ENCOUNTER (OUTPATIENT)
Age: 89
End: 2024-01-30

## 2024-01-30 VITALS
OXYGEN SATURATION: 97 % | SYSTOLIC BLOOD PRESSURE: 133 MMHG | RESPIRATION RATE: 18 BRPM | TEMPERATURE: 98 F | HEART RATE: 77 BPM | DIASTOLIC BLOOD PRESSURE: 54 MMHG

## 2024-01-30 PROCEDURE — 99239 HOSP IP/OBS DSCHRG MGMT >30: CPT

## 2024-01-30 RX ORDER — CEFDINIR 250 MG/5ML
1 POWDER, FOR SUSPENSION ORAL
Qty: 10 | Refills: 0
Start: 2024-01-30

## 2024-01-30 RX ORDER — METOPROLOL TARTRATE 50 MG
0.5 TABLET ORAL
Qty: 30 | Refills: 0
Start: 2024-01-30 | End: 2024-02-28

## 2024-01-30 RX ORDER — TAMSULOSIN HYDROCHLORIDE 0.4 MG/1
1 CAPSULE ORAL
Qty: 30 | Refills: 0
Start: 2024-01-30

## 2024-01-30 RX ADMIN — HEPARIN SODIUM 5000 UNIT(S): 5000 INJECTION INTRAVENOUS; SUBCUTANEOUS at 10:45

## 2024-01-30 RX ADMIN — Medication 81 MILLIGRAM(S): at 10:45

## 2024-01-30 RX ADMIN — CEFTRIAXONE 1000 MILLIGRAM(S): 500 INJECTION, POWDER, FOR SOLUTION INTRAMUSCULAR; INTRAVENOUS at 06:02

## 2024-01-30 RX ADMIN — CLOPIDOGREL BISULFATE 75 MILLIGRAM(S): 75 TABLET, FILM COATED ORAL at 10:45

## 2024-01-30 NOTE — DISCHARGE NOTE PROVIDER - PROVIDER TOKENS
PROVIDER:[TOKEN:[[id number]],FOLLOWUP:[1 week]],PROVIDER:[TOKEN:[[id number]],FOLLOWUP:[1 week]],PROVIDER:[TOKEN:[[id number]],FOLLOWUP:[1 week]]

## 2024-01-30 NOTE — DISCHARGE NOTE PROVIDER - ATTENDING DISCHARGE PHYSICAL EXAMINATION:
Constitutional: NAD, awake and alert  HEENT: PERR, EOMI  Neck: Soft and supple,  No JVD  Respiratory: Breath sounds are clear bilaterally, No wheezing, rales or rhonchi  Cardiovascular: S1 and S2, regular rate and rhythm, no Murmurs  Gastrointestinal: Bowel Sounds present, soft, nontender, nondistended  Extremities: No peripheral edema  Vascular: 2+ peripheral pulses  Neurological: A/O x 3, no focal deficits  Musculoskeletal: 5/5 strength b/l upper and lower extremities  Skin: No rashes

## 2024-01-30 NOTE — DISCHARGE NOTE PROVIDER - NSDCCPCAREPLAN_GEN_ALL_CORE_FT
PRINCIPAL DISCHARGE DIAGNOSIS  Diagnosis: Acute UTI  Assessment and Plan of Treatment: complete abx course      SECONDARY DISCHARGE DIAGNOSES  Diagnosis: Elevated troponin I level  Assessment and Plan of Treatment:     Diagnosis: Acute UTI  Assessment and Plan of Treatment:

## 2024-01-30 NOTE — DISCHARGE NOTE PROVIDER - NSDCMRMEDTOKEN_GEN_ALL_CORE_FT
Aspirin Low Dose 81 mg oral tablet, chewable: 1 tab(s) orally once a day  atorvastatin 20 mg oral tablet: 1 tab(s) orally once a day  cefdinir 300 mg oral capsule: 1 cap(s) orally once a day  Lasix 20 mg oral tablet: 1 tab(s) orally once a day  losartan 25 mg oral tablet: 1 tab(s) orally once a day  metoprolol tartrate 25 mg oral tablet: 0.5 tab(s) orally 2 times a day  Plavix 75 mg oral tablet: 1 tab(s) orally once a day  Symbicort 160 mcg-4.5 mcg/inh inhalation aerosol: 2 puff(s) inhaled once a day as needed for  tamsulosin 0.4 mg oral capsule: 1 cap(s) orally once a day (at bedtime)

## 2024-01-30 NOTE — DISCHARGE NOTE PROVIDER - CARE PROVIDER_API CALL
Dharmesh Lim  Family Medicine  [address] [place] [address]  Phone: [phone]  Fax: [phone]  Follow Up Time: 1 week    Rhonda Grover  Cardiology  [address] [facility] [place] [address]  Phone: [phone]  Fax: [phone]  Follow Up Time: 1 week    Emiliano Graves  Urology  [address] [facility], [address]  [place] [address]  Phone: [phone]  Fax: [phone]  Follow Up Time: 1 week

## 2024-01-30 NOTE — DISCHARGE NOTE NURSING/CASE MANAGEMENT/SOCIAL WORK - PATIENT PORTAL LINK FT
You can access the FollowMyHealth Patient Portal offered by [facility] by registering at the following website: [email or web address]. By joining [employer]’s FollowMyHealth portal, you will also be able to view your health information using other applications (apps) compatible with our system.

## 2024-01-30 NOTE — DISCHARGE NOTE PROVIDER - CARE PROVIDERS DIRECT ADDRESSES
[email or web address]

## 2024-01-30 NOTE — PROGRESS NOTE ADULT - SUBJECTIVE AND OBJECTIVE BOX
Pt seen with pt's daughter this morning. Pt prefers daughter to interpret did not want  services when offered. Pt and pt's daughter report patient feels well and has no complaints at this time. PVR 86-34 mL, 34 mL this am, pt denies any urinary complaints.    PE  VITALS  T(C): 36.8 (01-30-24 @ 07:49), Max: 36.8 (01-30-24 @ 07:49)  HR: 77 (01-30-24 @ 07:49) (77 - 82)  BP: 133/54 (01-30-24 @ 07:49) (125/58 - 133/54)  RR: 18 (01-30-24 @ 07:49) (17 - 18)  SpO2: 97% (01-30-24 @ 07:49) (97% - 99%)               Lung    : No resp distress  Abdo:   : Soft, Non tender, No guarding, No distension   Back    : No CVAT b/l  Genitalia Male: No Wheeler   Neuro   : A&Ox3       LABS                        10.0   10.48 )-----------( 201      ( 29 Jan 2024 06:42 )             31.9   01-29    142  |  113<H>  |  17  ----------------------------<  118<H>  4.1   |  27  |  0.98    Ca    8.4<L>      29 Jan 2024 06:42  Mg     2.3     01-29

## 2024-01-30 NOTE — DISCHARGE NOTE PROVIDER - HOSPITAL COURSE
86 yo M with PMHx of CAD s/p ROCAEL in 2019, hypertensive cardiomyopathy, HTN BIB daughter for approx 1 week of dysuria, urinary incontinence, started to have also fever,  chills, and generalized weakness over the last 2 days. Per daughter pt has had multiple UTIs and  is supposed to have a procedure in his penis to avoid infections but says has not scheduled bc is non urgent. Pt denies cp, sob, ha.  Patient admitted to the hospital service for sepsis secondary to complicated UTI.  Cultures grew back Klebsiella.  Patient initially started on IV Zosyn and vancomycin and IV fluids.  Transition to IV Rocephin.  Urology consulted.  Recommendations were to continue antibiotics.  Will start patient on Flomax.  Patient will follow-up with Dr. Graves outpatient for further workup/cystoscopy outpatient.  Patient will be discharged on oral antibiotics.  To complete an additional week. Patient admitted to the hospital service for sepsis secondary to complicated UTI.  Cultures grew back Klebsiella.  Patient initially started on IV Zosyn and vancomycin and IV fluids.  Transition to IV Rocephin.  Urology consulted.  Recommendations were to continue antibiotics.  Will start patient on Flomax.  Patient will follow-up with Dr. Graves outpatient for further workup/cystoscopy outpatient.  Patient seen by cardiology , elevated trops likely secondary to demand ischemia in the setting of sepsis.  will start low dose beta blockers .

## 2024-01-30 NOTE — PROGRESS NOTE ADULT - ASSESSMENT
86 yo male with PMH as above admitted for UTI. Has hx of urethral strictures and recurrent UTIs.  No significant PVR.   Recommend  - Continue antibiotics, adjust as per cultures/ sensitivities    - Flomax  - Follow up with Dr. Graves outpatient for further work up/ cystoscopy     Case discussed with Dr. Graves   
Pt is a 86 yo M with PMHx of CAD s/p ROCAEL in 2019, hypertensive cardiomyopathy, HTN BIB daughter for approx 1 week of dysuria, urinary incontinence, started to have also fever,  chills, and generalized weakness over the last 2 days. Per daughter pt has had multiple UTIs and  is supposed to have a procedure in his penis to avoid infections but says has not scheduled bc is non urgernt. Pt denies cp, sob, ha.       #Sepsis 2/2 Complicated UTI   -SIRS 3/4 on arrival with source of infection   -s/p Zosyn and Vancomycin in ED   -begin Ceftriaxone 1 gm q24   -only received 750 cc IVFs in ED 2/2 concern for volume overload  -previous in echo in July with adeuqtae EF, mod AS, no signs of overt HF on exam  -will order additional Liter  as pt's BP soft, check a bnp, hold antihypertensives  -fu cultures  -per daughter has frequent UTIs, will order urology consult    #Elevated Troponin   -likely 2/2 demand ischemia in setting of sepsis  -no signs or symptoms of ACS   -monitor on telemetry  -obtain TTE (last in july 2023)   - trops down, no angina     #CAD   -s/p ROCAEL in 2019  -unsure why on DAPT, pt follows at [facility] with Dr Grover. Will continue, cardiology to evaluate.   -c/w statin, metoprolol on hold    #Chronic Anemia  -monitor cbc   -prior Hx of GIB, denies bleeding at this time    #HTN   -will hold antihypertensive at this time in setting of sepsis and soft bp   -DASH diet     #CKD3  #Proteinuria   -avoid nephrotoxic medications   -check urine p/c ratio     #DVT PPx   -Heparin subQ      POC discussed with RN and wife at bedside 
NSTEMI, No chest pain. Possibly secondary to demand, and severe anemia  CAD by history  AS  Anemia       Stable cardiac status  Low dose short acting beta blockers if tolerates  Previously had bradycardia with verapamil and  beta blockers  D/W daughter at bedside
86 yo M with PMHx of CAD s/p ROCAEL in 2019, hypertensive cardiomyopathy, HTN BIB daughter for approx 1 week of dysuria, urinary incontinence, started to have also fever,  chills, and generalized weakness over the last 2 days.

## 2024-01-31 ENCOUNTER — NON-APPOINTMENT (OUTPATIENT)
Age: 89
End: 2024-01-31

## 2024-02-04 DIAGNOSIS — Z11.52 ENCOUNTER FOR SCREENING FOR COVID-19: ICD-10-CM

## 2024-02-04 DIAGNOSIS — N39.0 URINARY TRACT INFECTION, SITE NOT SPECIFIED: ICD-10-CM

## 2024-02-04 DIAGNOSIS — A41.59 OTHER GRAM-NEGATIVE SEPSIS: ICD-10-CM

## 2024-02-04 DIAGNOSIS — I25.10 ATHEROSCLEROTIC HEART DISEASE OF NATIVE CORONARY ARTERY WITHOUT ANGINA PECTORIS: ICD-10-CM

## 2024-02-04 DIAGNOSIS — I13.10 HYPERTENSIVE HEART AND CHRONIC KIDNEY DISEASE WITHOUT HEART FAILURE, WITH STAGE 1 THROUGH STAGE 4 CHRONIC KIDNEY DISEASE, OR UNSPECIFIED CHRONIC KIDNEY DISEASE: ICD-10-CM

## 2024-02-04 DIAGNOSIS — N40.1 BENIGN PROSTATIC HYPERPLASIA WITH LOWER URINARY TRACT SYMPTOMS: ICD-10-CM

## 2024-02-04 DIAGNOSIS — Z95.5 PRESENCE OF CORONARY ANGIOPLASTY IMPLANT AND GRAFT: ICD-10-CM

## 2024-02-04 DIAGNOSIS — N18.30 CHRONIC KIDNEY DISEASE, STAGE 3 UNSPECIFIED: ICD-10-CM

## 2024-02-04 DIAGNOSIS — I24.89 OTHER FORMS OF ACUTE ISCHEMIC HEART DISEASE: ICD-10-CM

## 2024-02-04 DIAGNOSIS — Z79.82 LONG TERM (CURRENT) USE OF ASPIRIN: ICD-10-CM

## 2024-02-04 DIAGNOSIS — D63.1 ANEMIA IN CHRONIC KIDNEY DISEASE: ICD-10-CM

## 2024-02-28 ENCOUNTER — NON-APPOINTMENT (OUTPATIENT)
Age: 89
End: 2024-02-28

## 2024-03-04 ENCOUNTER — APPOINTMENT (OUTPATIENT)
Dept: UROLOGY | Facility: CLINIC | Age: 89
End: 2024-03-04
Payer: MEDICARE

## 2024-03-04 VITALS
SYSTOLIC BLOOD PRESSURE: 122 MMHG | WEIGHT: 240 LBS | HEART RATE: 78 BPM | BODY MASS INDEX: 40.97 KG/M2 | DIASTOLIC BLOOD PRESSURE: 58 MMHG | HEIGHT: 64 IN

## 2024-03-04 DIAGNOSIS — N13.8 BENIGN PROSTATIC HYPERPLASIA WITH LOWER URINARY TRACT SYMPMS: ICD-10-CM

## 2024-03-04 DIAGNOSIS — N39.0 URINARY TRACT INFECTION, SITE NOT SPECIFIED: ICD-10-CM

## 2024-03-04 DIAGNOSIS — N40.1 BENIGN PROSTATIC HYPERPLASIA WITH LOWER URINARY TRACT SYMPMS: ICD-10-CM

## 2024-03-04 PROCEDURE — 99203 OFFICE O/P NEW LOW 30 MIN: CPT

## 2024-03-04 PROCEDURE — 99213 OFFICE O/P EST LOW 20 MIN: CPT

## 2024-03-04 PROCEDURE — 51798 US URINE CAPACITY MEASURE: CPT

## 2024-03-04 NOTE — HISTORY OF PRESENT ILLNESS
[FreeTextEntry1] : 88 year old man seen 03/04/2024 with complaint of frequency, urgency, hesitancy, weak stream, straining to void, sensation of incomplete emptying and nocturia 4x/night. For BPH, he is on tamsulosin, which has improved his symptoms. He reports UTIs approximately annually, most recently with admission to  for failure to clear infection with oral antibiotics. UCx grew Klebsiella. CT showed no urolithiasis or hydronephrosis. Apart from UTIs, LUTS are not bothersome. He was formerly managed by Dr Gomez, Dr German before that. There was reportedly discussion of procedure in OR, but this was deferred due to advanced age and comorbidities.  No hematuria, no dysuria. No incontinence. No fevers, no chills, no nausea, no vomiting, no flank pain.  Random bladder scan 212 mL.

## 2024-03-04 NOTE — PHYSICAL EXAM
[Normal Appearance] : normal appearance [Well Groomed] : well groomed [General Appearance - In No Acute Distress] : no acute distress [Edema] : no peripheral edema [Respiration, Rhythm And Depth] : normal respiratory rhythm and effort [Exaggerated Use Of Accessory Muscles For Inspiration] : no accessory muscle use [Abdomen Soft] : soft [Costovertebral Angle Tenderness] : no ~M costovertebral angle tenderness [Abdomen Tenderness] : non-tender [Penis Abnormality] : normal uncircumcised penis [Urethral Meatus] : meatus normal [Scrotum] : the scrotum was normal [Urinary Bladder Findings] : the bladder was normal on palpation [Testes Tenderness] : no tenderness of the testes [Testes Mass (___cm)] : there were no testicular masses [No Prostate Nodules] : no prostate nodules [Prostate Size ___ gm] : prostate size [unfilled] gm [Normal Station and Gait] : the gait and station were normal for the patient's age [] : no rash [No Focal Deficits] : no focal deficits [Oriented To Time, Place, And Person] : oriented to person, place, and time [Affect] : the affect was normal [Mood] : the mood was normal [No Palpable Adenopathy] : no palpable adenopathy

## 2024-03-04 NOTE — ASSESSMENT
[FreeTextEntry1] : 89 yo M with BPH with LUTS. Will continue alpha blocker. Discussed TURP or MST procedures for KENNEDY, but he declines. For frequent UTIs, No evidence of retention, no hydronephrosis or renal stones. Discussed D-mannose. Pt would like to try this. RTO in 3-6 months or sooner PRN.

## 2024-04-09 NOTE — PACU DISCHARGE NOTE - COMMENTS
pt a+o x4. vital signs stable. Pt safety maintained. Right groin site benign, no hematoma, soft, nontender, +1 palpable pulses bilaterally, confirmed pedal pulse with doppler. Right lower extremity warm to touch, redness noted to calf area. IV fluids at NS @75cc/hr. Skin intact. Will continue to monitor pt status. Bedside report done with Kat BLOUNT. Pt placed on transport monitor
Fall with Harm Risk

## 2024-04-30 NOTE — ED PROVIDER NOTE - IV ALTEPLASE DOOR HIDDEN
Wound Care Supplies      Supply Company:   [employer]    Ordering Center:     [facility]  [address]  [facility] [address]  [phone]  [phone]     Patient Information:      Jules Anthony  [address] [facility] [address]   [phone]   : 1978  AGE: 46 y.o.     GENDER: male   EPISODE DATE: 2024    Insurance:      PRIMARY INSURANCE:  Plan[place]  [place]age[place]  Effective Date: 2023  Group Number: [unfilled]  Subscriber Number: [id number] - (Medicaid Managed)    Payer/Plan Subscr  Sex Relation Sub. Ins. ID Effective Group Num   1. [employer] MEDICA* JULES ANTHONY 1978 Male Self [id number]                                   [address] [id number]       Patient Wound Information:    Problem List  Hidradenitis suppurativa of multiple sites L73.2       WOUNDS REQUIRING DRESSING SUPPLIES:     Wound 24 Axilla Right #1 (Active)   Wound Image   24 1522   Dressing Status New drainage noted;Old drainage noted 24 1522   Wound Cleansed Vashe 24 1522   Wound Length (cm) 13.5 cm 24 1522   Wound Width (cm) 5.8 cm 24 1522   Wound Depth (cm) 0.2 cm 24 1522   Wound Surface Area (cm^2) 78.3 cm^2 24 1522   Wound Volume (cm^3) 15.66 cm^3 24 1522   Post-Procedure Length (cm) 13.5 cm 24 1522   Post-Procedure Width (cm) 5.8 cm 24 1522   Post-Procedure Depth (cm) 0.2 cm 24 1522   Post-Procedure Surface Area (cm^2) 78.3 cm^2 24 1522   Post-Procedure Volume (cm^3) 15.66 cm^3 24 1522   Wound Assessment Hyper granulation tissue 24 1522   Drainage Amount Moderate (25-50%) 24 1522   Drainage Description Green;Serosanguinous 24 1522   Odor None 24 1522   Sariah-wound Assessment Hyperpigmented 24 1522   Margins Defined edges 24 1522   Wound Thickness Description not for Pressure Injury Full  Spoke with Corrie FAROOQ in office @ Dr Garcia to ask for appointment, states no longer sees hydrodinitis patients and will ask him if will see this pt and call us back   show   Wound Volume (cm^3) 23.76 cm^3 04/30/24 1522   Post-Procedure Length (cm) 7.2 cm 04/30/24 1522   Post-Procedure Width (cm) 2.2 cm 04/30/24 1522   Post-Procedure Depth (cm) 1.5 cm 04/30/24 1522   Post-Procedure Surface Area (cm^2) 15.84 cm^2 04/30/24 1522   Post-Procedure Volume (cm^3) 23.76 cm^3 04/30/24 1522   Wound Assessment Hyper granulation tissue 04/30/24 1522   Drainage Amount Moderate (25-50%) 04/30/24 1522   Drainage Description Serosanguinous 04/30/24 1522   Odor None 04/30/24 1522   Sariah-wound Assessment Hyperpigmented 04/30/24 1522   Margins Defined edges 04/30/24 1522   Wound Thickness Description not for Pressure Injury Full thickness 04/30/24 1522   Number of days: 0          Percent of Wound(s)/Ulcer(s) Debrided: 100%    Total Surface Area Debrided:  2 sq cm     Diabetic/Pressure/Non Pressure Ulcers only:  Ulcer:  excisional hidradenitis site    Estimated Blood Loss:  None    Hemostasis Achieved:  not needed    Procedural Pain:  0  / 10     Post Procedural Pain:  0 / 10     Response to treatment:  Well tolerated by patient.       Plan:     Treatment Note please see Discharge Instructions Needs to follow up with Dr Garcia (plastics) and Dr Sapp  (dermatology)    Written patient dismissal instructions given to patient and signed by patient or POA.           Electronically signed by Мария Ragsdale MD on 4/30/2024 at 3:39 PM

## 2024-05-28 ENCOUNTER — APPOINTMENT (OUTPATIENT)
Dept: OPHTHALMOLOGY | Facility: CLINIC | Age: 89
End: 2024-05-28

## 2024-07-15 ENCOUNTER — NON-APPOINTMENT (OUTPATIENT)
Age: 89
End: 2024-07-15

## 2024-07-15 ENCOUNTER — APPOINTMENT (OUTPATIENT)
Dept: OPHTHALMOLOGY | Facility: CLINIC | Age: 89
End: 2024-07-15
Payer: MEDICARE

## 2024-07-15 PROCEDURE — 92014 COMPRE OPH EXAM EST PT 1/>: CPT

## 2024-07-15 PROCEDURE — 92133 CPTRZD OPH DX IMG PST SGM ON: CPT

## 2024-09-06 ENCOUNTER — APPOINTMENT (OUTPATIENT)
Dept: UROLOGY | Facility: CLINIC | Age: 89
End: 2024-09-06
Payer: MEDICARE

## 2024-09-06 VITALS
HEART RATE: 63 BPM | WEIGHT: 240 LBS | BODY MASS INDEX: 41.2 KG/M2 | DIASTOLIC BLOOD PRESSURE: 73 MMHG | SYSTOLIC BLOOD PRESSURE: 122 MMHG

## 2024-09-06 DIAGNOSIS — N39.0 URINARY TRACT INFECTION, SITE NOT SPECIFIED: ICD-10-CM

## 2024-09-06 DIAGNOSIS — N13.8 BENIGN PROSTATIC HYPERPLASIA WITH LOWER URINARY TRACT SYMPMS: ICD-10-CM

## 2024-09-06 DIAGNOSIS — N40.1 BENIGN PROSTATIC HYPERPLASIA WITH LOWER URINARY TRACT SYMPMS: ICD-10-CM

## 2024-09-06 PROCEDURE — 99213 OFFICE O/P EST LOW 20 MIN: CPT

## 2024-09-06 NOTE — ASSESSMENT
- Klonopin refilled, terms of prescribing controlled substances discussed and PDMP reviewed   - Follow-up further next visit [FreeTextEntry1] : 89 yo M with BPH with LUTS. Will continue alpha blocker. Discussed TURP or MST procedures for KENNEDY, but he declines.  For frequent UTIs, Conitnue D-mannose. RTO in 6 months or sooner PRN.

## 2024-09-06 NOTE — HISTORY OF PRESENT ILLNESS
[FreeTextEntry1] : 88 year old man seen 03/04/2024 with complaint of frequency, urgency, hesitancy, weak stream, straining to void, sensation of incomplete emptying and nocturia 4x/night. For BPH, he is on tamsulosin, which has improved his symptoms. He reports UTIs approximately annually, most recently with admission to  for failure to clear infection with oral antibiotics. UCx grew Klebsiella. CT showed no urolithiasis or hydronephrosis. Apart from UTIs, LUTS are not bothersome. He was formerly managed by Dr Gomez, Dr German before that. There was reportedly discussion of procedure in OR, but this was deferred due to advanced age and comorbidities.  No hematuria, no dysuria. No incontinence. No fevers, no chills, no nausea, no vomiting, no flank pain.  Random bladder scan 212 mL.   09/06/2024: Patient presents for follow up. He reports urinary symptoms of weak stream and frqeuency remain, but are not bothersome. No UTIs since last visit.

## 2024-09-06 NOTE — ASSESSMENT
[FreeTextEntry1] : 89 yo M with BPH with LUTS. Will continue alpha blocker. Discussed TURP or MST procedures for KENNEDY, but he declines.  For frequent UTIs, Conitnue D-mannose. RTO in 6 months or sooner PRN.

## 2024-09-06 NOTE — PHYSICAL EXAM
[Normal Appearance] : normal appearance [Well Groomed] : well groomed [General Appearance - In No Acute Distress] : no acute distress [Edema] : no peripheral edema [Respiration, Rhythm And Depth] : normal respiratory rhythm and effort [Exaggerated Use Of Accessory Muscles For Inspiration] : no accessory muscle use [Abdomen Soft] : soft [Abdomen Tenderness] : non-tender [Costovertebral Angle Tenderness] : no ~M costovertebral angle tenderness [Urethral Meatus] : meatus normal [Penis Abnormality] : normal uncircumcised penis [Urinary Bladder Findings] : the bladder was normal on palpation [Scrotum] : the scrotum was normal [Testes Tenderness] : no tenderness of the testes [Testes Mass (___cm)] : there were no testicular masses [No Prostate Nodules] : no prostate nodules [Prostate Size ___ gm] : prostate size [unfilled] gm [Normal Station and Gait] : the gait and station were normal for the patient's age [] : no rash [No Focal Deficits] : no focal deficits [Oriented To Time, Place, And Person] : oriented to person, place, and time [Affect] : the affect was normal [Mood] : the mood was normal [No Palpable Adenopathy] : no palpable adenopathy

## 2024-10-14 ENCOUNTER — NON-APPOINTMENT (OUTPATIENT)
Age: 89
End: 2024-10-14

## 2024-10-14 ENCOUNTER — APPOINTMENT (OUTPATIENT)
Dept: OPHTHALMOLOGY | Facility: CLINIC | Age: 89
End: 2024-10-14
Payer: MEDICARE

## 2024-10-14 PROCEDURE — 99214 OFFICE O/P EST MOD 30 MIN: CPT

## 2024-10-14 PROCEDURE — 92136 OPHTHALMIC BIOMETRY: CPT

## 2024-11-03 NOTE — PATIENT PROFILE ADULT - VISION (WITH CORRECTIVE LENSES IF THE PATIENT USUALLY WEARS THEM):
Partially impaired: cannot see medication labels or newsprint, but can see obstacles in path, and the surrounding layout; can count fingers at arm's length
pmd

## 2024-11-13 ENCOUNTER — OUTPATIENT (OUTPATIENT)
Dept: OUTPATIENT SERVICES | Facility: HOSPITAL | Age: 89
LOS: 1 days | End: 2024-11-13
Payer: COMMERCIAL

## 2024-11-13 VITALS
RESPIRATION RATE: 14 BRPM | DIASTOLIC BLOOD PRESSURE: 80 MMHG | HEART RATE: 64 BPM | TEMPERATURE: 98 F | SYSTOLIC BLOOD PRESSURE: 130 MMHG | HEIGHT: 64 IN | WEIGHT: 244.93 LBS

## 2024-11-13 DIAGNOSIS — Z01.818 ENCOUNTER FOR OTHER PREPROCEDURAL EXAMINATION: ICD-10-CM

## 2024-11-13 DIAGNOSIS — Z98.890 OTHER SPECIFIED POSTPROCEDURAL STATES: Chronic | ICD-10-CM

## 2024-11-13 DIAGNOSIS — H25.811 COMBINED FORMS OF AGE-RELATED CATARACT, RIGHT EYE: ICD-10-CM

## 2024-11-13 DIAGNOSIS — Z98.42 CATARACT EXTRACTION STATUS, LEFT EYE: Chronic | ICD-10-CM

## 2024-11-13 DIAGNOSIS — Z95.818 PRESENCE OF OTHER CARDIAC IMPLANTS AND GRAFTS: Chronic | ICD-10-CM

## 2024-11-13 DIAGNOSIS — I25.10 ATHEROSCLEROTIC HEART DISEASE OF NATIVE CORONARY ARTERY WITHOUT ANGINA PECTORIS: Chronic | ICD-10-CM

## 2024-11-13 DIAGNOSIS — H26.9 UNSPECIFIED CATARACT: ICD-10-CM

## 2024-11-13 PROBLEM — I42.9 CARDIOMYOPATHY, UNSPECIFIED: Chronic | Status: INACTIVE | Noted: 2018-07-16 | Resolved: 2024-11-13

## 2024-11-13 LAB
ANION GAP SERPL CALC-SCNC: 8 MMOL/L — SIGNIFICANT CHANGE UP (ref 5–17)
BUN SERPL-MCNC: 17 MG/DL — SIGNIFICANT CHANGE UP (ref 7–23)
CALCIUM SERPL-MCNC: 9.2 MG/DL — SIGNIFICANT CHANGE UP (ref 8.4–10.5)
CHLORIDE SERPL-SCNC: 105 MMOL/L — SIGNIFICANT CHANGE UP (ref 96–108)
CO2 SERPL-SCNC: 30 MMOL/L — SIGNIFICANT CHANGE UP (ref 22–31)
CREAT SERPL-MCNC: 1.23 MG/DL — SIGNIFICANT CHANGE UP (ref 0.5–1.3)
EGFR: 56 ML/MIN/1.73M2 — LOW
GLUCOSE SERPL-MCNC: 108 MG/DL — HIGH (ref 70–99)
HCT VFR BLD CALC: 39.8 % — SIGNIFICANT CHANGE UP (ref 39–50)
HGB BLD-MCNC: 12.6 G/DL — LOW (ref 13–17)
MCHC RBC-ENTMCNC: 28.3 PG — SIGNIFICANT CHANGE UP (ref 27–34)
MCHC RBC-ENTMCNC: 31.7 G/DL — LOW (ref 32–36)
MCV RBC AUTO: 89.2 FL — SIGNIFICANT CHANGE UP (ref 80–100)
NRBC # BLD: 0 /100 WBCS — SIGNIFICANT CHANGE UP (ref 0–0)
PLATELET # BLD AUTO: 210 K/UL — SIGNIFICANT CHANGE UP (ref 150–400)
POTASSIUM SERPL-MCNC: 4.1 MMOL/L — SIGNIFICANT CHANGE UP (ref 3.5–5.3)
POTASSIUM SERPL-SCNC: 4.1 MMOL/L — SIGNIFICANT CHANGE UP (ref 3.5–5.3)
RBC # BLD: 4.46 M/UL — SIGNIFICANT CHANGE UP (ref 4.2–5.8)
RBC # FLD: 14.6 % — HIGH (ref 10.3–14.5)
SODIUM SERPL-SCNC: 143 MMOL/L — SIGNIFICANT CHANGE UP (ref 135–145)
WBC # BLD: 6.03 K/UL — SIGNIFICANT CHANGE UP (ref 3.8–10.5)
WBC # FLD AUTO: 6.03 K/UL — SIGNIFICANT CHANGE UP (ref 3.8–10.5)

## 2024-11-13 PROCEDURE — G0463: CPT

## 2024-11-13 PROCEDURE — 85027 COMPLETE CBC AUTOMATED: CPT

## 2024-11-13 PROCEDURE — 80048 BASIC METABOLIC PNL TOTAL CA: CPT

## 2024-11-13 PROCEDURE — 36415 COLL VENOUS BLD VENIPUNCTURE: CPT

## 2024-11-13 NOTE — H&P PST ADULT - PROBLEM SELECTOR PLAN 1
scheduled for right eye cataract extraction on 11/19/24   will obtain medical and cardiac clearance   Follow up with cardiologist on Plavix instruction for upcoming surgery. scheduled for right eye cataract extraction on 11/19/24   will obtain medical and cardiac clearance and EKG already done   Follow up with cardiologist on Plavix instruction for upcoming surgery.

## 2024-11-13 NOTE — H&P PST ADULT - HISTORY OF PRESENT ILLNESS
Pt is a 88yo M with PMHx of CAD s/p ROCAEL in 2019, hypertensive cardiomyopathy, HTN  Pt is a 89yo M with PMHx of CAD s/p ROCAEL in 2019, hypertensive cardiomyopathy, HTN , HLD , right eye cataract presents with progressive loss of vision right eye. scheduled for right eye catarct extraction on 11/19/24

## 2024-11-13 NOTE — H&P PST ADULT - NSICDXPASTMEDICALHX_GEN_ALL_CORE_FT
PAST MEDICAL HISTORY:  BPH (benign prostatic hyperplasia)     CAD (coronary artery disease)     HLD (hyperlipidemia)     HTN (hypertension)     Hypertensive cardiomyopathy     Mild asthma     HERNESTO on CPAP     Right cataract     Stented coronary artery      PAST MEDICAL HISTORY:  Aortic stenosis     BPH (benign prostatic hyperplasia)     CAD (coronary artery disease)     HLD (hyperlipidemia)     HTN (hypertension)     Hypertensive cardiomyopathy     Mild asthma     Morbid obesity with BMI of 40.0-44.9, adult     HERNESTO on CPAP     Right cataract     Stented coronary artery      PAST MEDICAL HISTORY:  Aortic stenosis     BPH (benign prostatic hyperplasia)     CAD (coronary artery disease)     HLD (hyperlipidemia)     HTN (hypertension)     Hypertensive cardiomyopathy     Leg swelling     Mild asthma     Morbid obesity with BMI of 40.0-44.9, adult     HERNESTO on CPAP     Right cataract     Stented coronary artery     Venous insufficiency

## 2024-11-13 NOTE — H&P PST ADULT - CARDIOVASCULAR
regular rate and rhythm/S1 S2 present details… regular rate and rhythm/S1 S2 present/murmur regular rate and rhythm/S1 S2 present/murmur/pedal edema/vascular

## 2024-11-13 NOTE — H&P PST ADULT - NSICDXPASTSURGICALHX_GEN_ALL_CORE_FT
PAST SURGICAL HISTORY:  CAD S/P percutaneous coronary angioplasty     Cataract extraction status of left eye     History of colonoscopy     Implantable loop recorder present

## 2024-11-13 NOTE — H&P PST ADULT - LAST CARDIAC ANGIOGRAM/IMAGING
CAD X1 stent 2019 , 8/ 2023 x 1 stent , 11/2023 x 1 stent CAD x 3 stents (ROCAEL to RCA 2019 ,, ROCAEL to mid LAD  8/ 2023 . ostial LAD  11/29.2023 )

## 2024-11-19 ENCOUNTER — TRANSCRIPTION ENCOUNTER (OUTPATIENT)
Age: 89
End: 2024-11-19

## 2024-11-19 ENCOUNTER — APPOINTMENT (OUTPATIENT)
Dept: OPHTHALMOLOGY | Facility: HOSPITAL | Age: 89
End: 2024-11-19
Payer: MEDICARE

## 2024-11-19 ENCOUNTER — OUTPATIENT (OUTPATIENT)
Dept: OUTPATIENT SERVICES | Facility: HOSPITAL | Age: 89
LOS: 1 days | End: 2024-11-19
Payer: COMMERCIAL

## 2024-11-19 VITALS
OXYGEN SATURATION: 100 % | DIASTOLIC BLOOD PRESSURE: 70 MMHG | TEMPERATURE: 98 F | SYSTOLIC BLOOD PRESSURE: 132 MMHG | RESPIRATION RATE: 18 BRPM | HEART RATE: 70 BPM

## 2024-11-19 VITALS
WEIGHT: 239.86 LBS | OXYGEN SATURATION: 100 % | TEMPERATURE: 97 F | DIASTOLIC BLOOD PRESSURE: 59 MMHG | HEIGHT: 63 IN | SYSTOLIC BLOOD PRESSURE: 121 MMHG | HEART RATE: 67 BPM | RESPIRATION RATE: 10 BRPM

## 2024-11-19 DIAGNOSIS — Z98.890 OTHER SPECIFIED POSTPROCEDURAL STATES: Chronic | ICD-10-CM

## 2024-11-19 DIAGNOSIS — Z95.818 PRESENCE OF OTHER CARDIAC IMPLANTS AND GRAFTS: Chronic | ICD-10-CM

## 2024-11-19 DIAGNOSIS — I25.10 ATHEROSCLEROTIC HEART DISEASE OF NATIVE CORONARY ARTERY WITHOUT ANGINA PECTORIS: Chronic | ICD-10-CM

## 2024-11-19 DIAGNOSIS — H25.811 COMBINED FORMS OF AGE-RELATED CATARACT, RIGHT EYE: ICD-10-CM

## 2024-11-19 DIAGNOSIS — Z01.818 ENCOUNTER FOR OTHER PREPROCEDURAL EXAMINATION: ICD-10-CM

## 2024-11-19 DIAGNOSIS — Z98.42 CATARACT EXTRACTION STATUS, LEFT EYE: Chronic | ICD-10-CM

## 2024-11-19 DIAGNOSIS — Z95.5 PRESENCE OF CORONARY ANGIOPLASTY IMPLANT AND GRAFT: Chronic | ICD-10-CM

## 2024-11-19 PROCEDURE — 66984 XCAPSL CTRC RMVL W/O ECP: CPT | Mod: RT

## 2024-11-19 PROCEDURE — V2632: CPT

## 2024-11-19 DEVICE — LENS IOL TECNIS EYHANCE DIB00 22.5D
Type: IMPLANTABLE DEVICE | Site: RIGHT | Status: NON-FUNCTIONAL
Removed: 2024-11-19

## 2024-11-19 RX ORDER — METOPROLOL TARTRATE 50 MG
1 TABLET ORAL
Refills: 0 | DISCHARGE

## 2024-11-19 NOTE — ASU PATIENT PROFILE, ADULT - NSICDXPASTMEDICALHX_GEN_ALL_CORE_FT
PAST MEDICAL HISTORY:  Aortic stenosis     BPH (benign prostatic hyperplasia)     CAD (coronary artery disease)     HLD (hyperlipidemia)     HTN (hypertension)     Hypertensive cardiomyopathy     Leg swelling     Mild asthma     Morbid obesity with BMI of 40.0-44.9, adult     HERNESTO on CPAP     Right cataract     Stented coronary artery     Venous insufficiency

## 2024-11-19 NOTE — ASU DISCHARGE PLAN (ADULT/PEDIATRIC) - FINANCIAL ASSISTANCE
[facility] provides services at a reduced cost to those who are determined to be eligible through [facility]’s financial assistance program. Information regarding [facility]’s financial assistance program can be found by going to [email or web address] or by calling [phone].

## 2024-11-19 NOTE — ASU DISCHARGE PLAN (ADULT/PEDIATRIC) - CARE PROVIDER_API CALL
Shaheed Valenzuela Min  Ophthalmology  [address] [place] [id number]  Phone: [phone]  Fax: [phone]  Established Patient  Scheduled Appointment: 11/20/2024 09:45 AM

## 2024-11-19 NOTE — ASU DISCHARGE PLAN (ADULT/PEDIATRIC) - NS MD DC FALL RISK RISK
For information on Fall & Injury Prevention, visit: [email or web address] OR  [email or web address] OR  https://www.cdc.gov/steadi/patient.html

## 2024-11-19 NOTE — ASU DISCHARGE PLAN (ADULT/PEDIATRIC) - ASU DC SPECIAL INSTRUCTIONSFT
Post-operative Instructions:     1. Please keep patch and shield over right eye until see at your post-operative appointment tomorrow.   2. Please avoid submerging the head under water or getting your post-operative dressing wet. You can sponge bath from the neck down.   3. Please avoid any straining, bending, heavy lifting and exercise for 2 weeks.   4. Please continue all of your medications as prescribed. You should continue all regular eye drops as prescribed as well, but in the left eye only.  5. Please follow-up for your appointment tomorrow 11/20/24 at 9:45 AM at the [facility] [place] (address listed below)

## 2024-11-19 NOTE — ASU PATIENT PROFILE, ADULT - NSICDXPASTSURGICALHX_GEN_ALL_CORE_FT
PAST SURGICAL HISTORY:  CAD S/P percutaneous coronary angioplasty     Cataract extraction status of left eye     History of colonoscopy     Implantable loop recorder present      PAST SURGICAL HISTORY:  CAD S/P percutaneous coronary angioplasty     Cataract extraction status of left eye     History of colonoscopy     Implantable loop recorder present     Stented coronary artery

## 2024-11-19 NOTE — ASU PATIENT PROFILE, ADULT - LANGUAGE ASSISTANCE NEEDED
Patient also gave permission, through , for daughter Maegan to translate as needed today/Yes-Patient/Caregiver accepts free interpretation services...

## 2024-11-19 NOTE — ASU DISCHARGE PLAN (ADULT/PEDIATRIC) - PROVIDER TOKENS
PROVIDER:[TOKEN:[[id number]],SCHEDULEDAPPT:[11/20/2024],SCHEDULEDAPPTTIME:[09:45 AM],ESTABLISHEDPATIENT:[T]]

## 2024-11-19 NOTE — ASU PATIENT PROFILE, ADULT - DOES PATIENT HAVE ADVANCE DIRECTIVE
It was a pleasure seeing you for your physical examination.  I wanted to get back to you with your test results.  I have enclosed a copy for your review.     I am happy to report that your cbc or complete blood count is normal with no signs of anemia, leukemia or platelet abnormalities. Your chemistry panel shows your sugar to be just slightly above normal.  I am not concerned about this and I am not entirely sure if you had fasted but either way please be sure to continue to exercise and keep your weight down for this.  Your blood salts, kidney tests, liver tests, and proteins are all fine.    Your total cholesterol is 181 with the normal range being below 200.  Your HDL or good cholesterol is 86 with the normal range being above 50.  Your LDL or bad cholesterol is 82 with the normal range being below 130.  These numbers are super.    I am happy to bring you this overall excellent report.  If you have any questions please call me.    Milton Lyle M.D.      
No

## 2024-11-19 NOTE — ASU DISCHARGE PLAN (ADULT/PEDIATRIC) - CALL YOUR DOCTOR IF YOU HAVE ANY OF THE FOLLOWING:
Swelling that gets worse/Pain not relieved by Medications/Fever greater than (need to indicate Fahrenheit or Celsius)/Nausea and vomiting that does not stop

## 2024-11-19 NOTE — ASU PATIENT PROFILE, ADULT - FALL HARM RISK - HARM RISK INTERVENTIONS
Assistance with ambulation/Assistance OOB with selected safe patient handling equipment/Communicate Risk of Fall with Harm to all staff/Discuss with provider need for PT consult/Monitor for mental status changes/Monitor gait and stability/Provide patient with walking aids - walker, cane, crutches/Reinforce activity limits and safety measures with patient and family/Tailored Fall Risk Interventions/Visual Cue: Yellow wristband and red socks/Bed in lowest position, wheels locked, appropriate side rails in place/Call bell, personal items and telephone in reach/Instruct patient to call for assistance before getting out of bed or chair/Non-slip footwear when patient is out of bed/[place] to call system/Physically safe environment - no spills, clutter or unnecessary equipment/Purposeful Proactive Rounding/Room/bathroom lighting operational, light cord in reach

## 2024-11-19 NOTE — ASU PATIENT PROFILE, ADULT - PAIN CHRONIC, PROFILE
Pt overall feels better, c/o mild Rt knee pain.  No fevers, CP or SOB.    Date of Service: 03-23-19 @ 08:21    Vital Signs Last 24 Hrs  T(C): 37.1 (23 Mar 2019 05:12), Max: 37.1 (23 Mar 2019 05:12)  T(F): 98.7 (23 Mar 2019 05:12), Max: 98.7 (23 Mar 2019 05:12)  HR: 64 (23 Mar 2019 05:12) (64 - 71)  BP: 131/71 (23 Mar 2019 05:12) (109/57 - 131/71)  BP(mean): --  RR: 18 (23 Mar 2019 05:12) (18 - 18)  SpO2: 93% (23 Mar 2019 05:12) (93% - 97%)    Daily     Daily     I&O's Detail    22 Mar 2019 07:01  -  23 Mar 2019 07:00  --------------------------------------------------------  IN:    Oral Fluid: 720 mL  Total IN: 720 mL    OUT:    Voided: 1 mL  Total OUT: 1 mL    Total NET: 719 mL          CAPILLARY BLOOD GLUCOSE                                          10.7   8.41  )-----------( 233      ( 23 Mar 2019 06:20 )             33.3       03-23    143  |  108  |  23  ----------------------------<  99  4.2   |  29  |  1.01    Ca    9.1      23 Mar 2019 06:20                        MEDICATIONS  (STANDING):  acetaminophen   Tablet .. 650 milliGRAM(s) Oral every 6 hours  ascorbic acid 500 milliGRAM(s) Oral two times a day  calcium carbonate 1250 mG  + Vitamin D (OsCal 500 + D) 1 Tablet(s) Oral three times a day  clobetasol 0.05% Cream 1 Application(s) Topical two times a day  docusate sodium 100 milliGRAM(s) Oral three times a day  ferrous    sulfate 325 milliGRAM(s) Oral three times a day with meals  folic acid 1 milliGRAM(s) Oral daily  lactated ringers. 1000 milliLiter(s) (75 mL/Hr) IV Continuous <Continuous>  losartan 50 milliGRAM(s) Oral daily  multivitamin 1 Tablet(s) Oral daily  pantoprazole    Tablet 40 milliGRAM(s) Oral before breakfast  PARoxetine 40 milliGRAM(s) Oral at bedtime  polyethylene glycol 3350 17 Gram(s) Oral daily  rivaroxaban 10 milliGRAM(s) Oral daily  simvastatin 20 milliGRAM(s) Oral at bedtime    MEDICATIONS  (PRN):  acetaminophen   Tablet .. 650 milliGRAM(s) Oral every 6 hours PRN Temp greater or equal to 38C (100.4F), Mild Pain (1 - 3)  ALBUTerol    90 MICROgram(s) HFA Inhaler 2 Puff(s) Inhalation every 6 hours PRN Shortness of Breath and/or Wheezing  aluminum hydroxide/magnesium hydroxide/simethicone Suspension 30 milliLiter(s) Oral four times a day PRN Indigestion  benzocaine 15 mG/menthol 3.6 mG Lozenge 1 Lozenge Oral every 3 hours PRN Sore Throat  bisacodyl Suppository 10 milliGRAM(s) Rectal daily PRN If no bowel movement by postoperative day #2  diphenhydrAMINE 25 milliGRAM(s) Oral every 4 hours PRN Rash and/or Itching  diphenhydrAMINE 25 milliGRAM(s) Oral at bedtime PRN Insomnia  HYDROmorphone  Injectable 1 milliGRAM(s) SubCutaneous every 4 hours PRN Severe Pain (7 - 10)  magnesium hydroxide Suspension 30 milliLiter(s) Oral daily PRN Constipation  magnesium hydroxide Suspension 30 milliLiter(s) Oral four times a day PRN Constipation  ondansetron Injectable 4 milliGRAM(s) IV Push every 6 hours PRN Nausea and/or Vomiting  oxyCODONE    IR 10 milliGRAM(s) Oral every 4 hours PRN Moderate Pain (4 - 6)  senna 2 Tablet(s) Oral at bedtime PRN Constipation  traMADol 50 milliGRAM(s) Oral every 6 hours PRN Mild Pain (1 - 3) yes

## 2024-11-20 ENCOUNTER — APPOINTMENT (OUTPATIENT)
Dept: OPHTHALMOLOGY | Facility: CLINIC | Age: 89
End: 2024-11-20
Payer: MEDICARE

## 2024-11-20 ENCOUNTER — NON-APPOINTMENT (OUTPATIENT)
Age: 89
End: 2024-11-20

## 2024-11-20 PROCEDURE — 99024 POSTOP FOLLOW-UP VISIT: CPT

## 2024-11-26 ENCOUNTER — APPOINTMENT (OUTPATIENT)
Dept: OPHTHALMOLOGY | Facility: CLINIC | Age: 89
End: 2024-11-26
Payer: MEDICARE

## 2024-11-26 ENCOUNTER — NON-APPOINTMENT (OUTPATIENT)
Age: 89
End: 2024-11-26

## 2024-11-26 PROBLEM — I35.0 NONRHEUMATIC AORTIC (VALVE) STENOSIS: Chronic | Status: ACTIVE | Noted: 2024-11-13

## 2024-11-26 PROBLEM — E66.01 MORBID (SEVERE) OBESITY DUE TO EXCESS CALORIES: Chronic | Status: ACTIVE | Noted: 2024-11-13

## 2024-11-26 PROBLEM — E78.5 HYPERLIPIDEMIA, UNSPECIFIED: Chronic | Status: ACTIVE | Noted: 2024-11-13

## 2024-11-26 PROBLEM — M79.89 OTHER SPECIFIED SOFT TISSUE DISORDERS: Chronic | Status: ACTIVE | Noted: 2024-11-13

## 2024-11-26 PROBLEM — H26.9 UNSPECIFIED CATARACT: Chronic | Status: ACTIVE | Noted: 2024-11-13

## 2024-11-26 PROBLEM — N40.0 BENIGN PROSTATIC HYPERPLASIA WITHOUT LOWER URINARY TRACT SYMPTOMS: Chronic | Status: ACTIVE | Noted: 2024-11-13

## 2024-11-26 PROBLEM — I25.10 ATHEROSCLEROTIC HEART DISEASE OF NATIVE CORONARY ARTERY WITHOUT ANGINA PECTORIS: Chronic | Status: ACTIVE | Noted: 2024-11-13

## 2024-11-26 PROBLEM — I11.9 HYPERTENSIVE HEART DISEASE WITHOUT HEART FAILURE: Chronic | Status: ACTIVE | Noted: 2024-11-13

## 2024-11-26 PROBLEM — I87.2 VENOUS INSUFFICIENCY (CHRONIC) (PERIPHERAL): Chronic | Status: ACTIVE | Noted: 2024-11-13

## 2024-11-26 PROBLEM — J45.909 UNSPECIFIED ASTHMA, UNCOMPLICATED: Chronic | Status: ACTIVE | Noted: 2024-11-13

## 2024-11-26 PROBLEM — Z95.5 PRESENCE OF CORONARY ANGIOPLASTY IMPLANT AND GRAFT: Chronic | Status: ACTIVE | Noted: 2024-11-13

## 2024-11-26 PROBLEM — G47.33 OBSTRUCTIVE SLEEP APNEA (ADULT) (PEDIATRIC): Chronic | Status: ACTIVE | Noted: 2024-11-13

## 2024-11-26 PROCEDURE — 99024 POSTOP FOLLOW-UP VISIT: CPT

## 2024-12-16 ENCOUNTER — NON-APPOINTMENT (OUTPATIENT)
Age: 89
End: 2024-12-16

## 2024-12-16 ENCOUNTER — APPOINTMENT (OUTPATIENT)
Dept: OPHTHALMOLOGY | Facility: CLINIC | Age: 89
End: 2024-12-16
Payer: MEDICARE

## 2024-12-16 PROCEDURE — 99024 POSTOP FOLLOW-UP VISIT: CPT

## 2025-03-07 ENCOUNTER — APPOINTMENT (OUTPATIENT)
Dept: UROLOGY | Facility: CLINIC | Age: 89
End: 2025-03-07
Payer: MEDICARE

## 2025-03-07 VITALS
SYSTOLIC BLOOD PRESSURE: 115 MMHG | WEIGHT: 240 LBS | DIASTOLIC BLOOD PRESSURE: 68 MMHG | BODY MASS INDEX: 41.2 KG/M2 | HEART RATE: 87 BPM

## 2025-03-07 DIAGNOSIS — N13.8 BENIGN PROSTATIC HYPERPLASIA WITH LOWER URINARY TRACT SYMPMS: ICD-10-CM

## 2025-03-07 DIAGNOSIS — N39.0 URINARY TRACT INFECTION, SITE NOT SPECIFIED: ICD-10-CM

## 2025-03-07 DIAGNOSIS — N40.1 BENIGN PROSTATIC HYPERPLASIA WITH LOWER URINARY TRACT SYMPMS: ICD-10-CM

## 2025-03-07 PROCEDURE — 51798 US URINE CAPACITY MEASURE: CPT

## 2025-03-07 PROCEDURE — G2211 COMPLEX E/M VISIT ADD ON: CPT

## 2025-03-07 PROCEDURE — 99214 OFFICE O/P EST MOD 30 MIN: CPT

## 2025-05-22 NOTE — ED PROVIDER NOTE - CPE EDP GASTRO NORM
[de-identified] : Patient looking into getting new hearing aids, needed an ear check and cleaning. No pain in the ears, still having issues hearing hence the new aids no pain in the ears, no dizziness, no ringing in the ears  normal...

## 2025-06-02 NOTE — DISCHARGE NOTE PROVIDER - NSDCHHCONTACT_GEN_ALL_CORE_FT
Static and rhytides of the face.    As certified below, I, or a nurse practitioner or physician assistant working with me, had a face-to-face encounter that meets the physician face-to-face encounter requirements.

## 2025-06-16 ENCOUNTER — NON-APPOINTMENT (OUTPATIENT)
Age: 89
End: 2025-06-16

## 2025-06-16 ENCOUNTER — APPOINTMENT (OUTPATIENT)
Dept: OPHTHALMOLOGY | Facility: CLINIC | Age: 89
End: 2025-06-16
Payer: MEDICARE

## 2025-06-16 PROCEDURE — 92014 COMPRE OPH EXAM EST PT 1/>: CPT

## 2025-06-16 PROCEDURE — 92133 CPTRZD OPH DX IMG PST SGM ON: CPT

## (undated) DEVICE — KNIFE ALCON PARACENTESIS CLEARCUT SIDEPORT 1MM (YELLOW)

## (undated) DEVICE — GLV 6.5 PROTEXIS (WHITE)

## (undated) DEVICE — WARMING BLANKET LOWER ADULT

## (undated) DEVICE — KNIFE ALCON SLIT INTREPID SINGLE BEVEL ANGLED 2.4MM (PURPLE)

## (undated) DEVICE — TRANSFORMER INTREPID I/A 0.3MM

## (undated) DEVICE — PACK CATARACT ALTERNATE

## (undated) DEVICE — SOL IRR BAG BSS 500ML

## (undated) DEVICE — NDL HYPO NONSAFE 30G X 0.5" (BEIGE)

## (undated) DEVICE — SLEEVE INTREPID MICROSMOOTH ULTRA INFUSION 0.9MM (PINK)

## (undated) DEVICE — APPLICATOR COTTON TIP 3" STERILE

## (undated) DEVICE — VENODYNE/SCD SLEEVE CALF MEDIUM

## (undated) DEVICE — PACK OPHTHALMIC 30 BALANCE TIP CUSTOM 0.9MM